# Patient Record
Sex: FEMALE | Race: BLACK OR AFRICAN AMERICAN | Employment: OTHER | URBAN - METROPOLITAN AREA
[De-identification: names, ages, dates, MRNs, and addresses within clinical notes are randomized per-mention and may not be internally consistent; named-entity substitution may affect disease eponyms.]

---

## 2017-11-17 ENCOUNTER — HOSPITAL ENCOUNTER (OUTPATIENT)
Dept: MAMMOGRAPHY | Age: 77
Discharge: HOME | End: 2017-11-17
Attending: INTERNAL MEDICINE | Admitting: INTERNAL MEDICINE

## 2017-11-17 DIAGNOSIS — Z12.39 BREAST CANCER SCREENING: ICD-10-CM

## 2018-12-06 ENCOUNTER — HOSPITAL ENCOUNTER (OUTPATIENT)
Dept: WOMENS IMAGING | Age: 78
Discharge: HOME | End: 2018-12-06
Payer: COMMERCIAL

## 2018-12-06 DIAGNOSIS — Z12.31 ENCOUNTER FOR SCREENING MAMMOGRAM FOR BREAST CANCER: ICD-10-CM

## 2018-12-06 PROCEDURE — 77067 SCR MAMMO BI INCL CAD: CPT

## 2019-12-16 ENCOUNTER — HOSPITAL ENCOUNTER (OUTPATIENT)
Dept: WOMENS IMAGING | Age: 79
Discharge: HOME | End: 2019-12-16
Payer: COMMERCIAL

## 2019-12-16 DIAGNOSIS — Z12.31 BREAST CANCER SCREENING BY MAMMOGRAM: ICD-10-CM

## 2019-12-16 PROCEDURE — 77063 BREAST TOMOSYNTHESIS BI: CPT

## 2021-01-29 ENCOUNTER — HOSPITAL ENCOUNTER (OUTPATIENT)
Dept: WOMENS IMAGING | Age: 81
Discharge: HOME | End: 2021-01-29
Payer: COMMERCIAL

## 2021-01-29 DIAGNOSIS — Z12.31 VISIT FOR SCREENING MAMMOGRAM: ICD-10-CM

## 2021-01-29 PROCEDURE — 77063 BREAST TOMOSYNTHESIS BI: CPT

## 2021-03-24 ENCOUNTER — APPOINTMENT (OUTPATIENT)
Dept: GENERAL RADIOLOGY | Age: 81
End: 2021-03-24
Payer: MEDICARE

## 2021-03-24 ENCOUNTER — HOSPITAL ENCOUNTER (OUTPATIENT)
Age: 81
Setting detail: OBSERVATION
Discharge: HOME | End: 2021-03-26
Attending: EMERGENCY MEDICINE | Admitting: HOSPITALIST
Payer: MEDICARE

## 2021-03-24 DIAGNOSIS — R07.9 CHEST PAIN, UNSPECIFIED TYPE: Primary | ICD-10-CM

## 2021-03-24 LAB
A/G RATIO: 1.4 (ref 1.1–2.2)
ALBUMIN SERPL-MCNC: 4.1 G/DL (ref 3.4–5)
ALP BLD-CCNC: 59 U/L (ref 40–129)
ALT SERPL-CCNC: 17 U/L (ref 10–40)
ANION GAP SERPL CALCULATED.3IONS-SCNC: 13 MMOL/L (ref 3–16)
AST SERPL-CCNC: 18 U/L (ref 15–37)
BASOPHILS ABSOLUTE: 0.1 K/UL (ref 0–0.2)
BASOPHILS RELATIVE PERCENT: 0.9 %
BILIRUB SERPL-MCNC: 0.3 MG/DL (ref 0–1)
BUN BLDV-MCNC: 16 MG/DL (ref 7–20)
CALCIUM SERPL-MCNC: 9 MG/DL (ref 8.3–10.6)
CHLORIDE BLD-SCNC: 103 MMOL/L (ref 99–110)
CO2: 21 MMOL/L (ref 21–32)
CREAT SERPL-MCNC: 0.8 MG/DL (ref 0.6–1.2)
EOSINOPHILS ABSOLUTE: 0.7 K/UL (ref 0–0.6)
EOSINOPHILS RELATIVE PERCENT: 11.5 %
GFR AFRICAN AMERICAN: >60
GFR NON-AFRICAN AMERICAN: >60
GLOBULIN: 2.9 G/DL
GLUCOSE BLD-MCNC: 142 MG/DL (ref 70–99)
HCT VFR BLD CALC: 36.1 % (ref 36–48)
HEMOGLOBIN: 11.6 G/DL (ref 12–16)
LYMPHOCYTES ABSOLUTE: 1.7 K/UL (ref 1–5.1)
LYMPHOCYTES RELATIVE PERCENT: 26.6 %
MCH RBC QN AUTO: 28 PG (ref 26–34)
MCHC RBC AUTO-ENTMCNC: 32.2 G/DL (ref 31–36)
MCV RBC AUTO: 86.9 FL (ref 80–100)
MONOCYTES ABSOLUTE: 0.5 K/UL (ref 0–1.3)
MONOCYTES RELATIVE PERCENT: 8 %
NEUTROPHILS ABSOLUTE: 3.3 K/UL (ref 1.7–7.7)
NEUTROPHILS RELATIVE PERCENT: 53 %
PDW BLD-RTO: 13.9 % (ref 12.4–15.4)
PLATELET # BLD: 256 K/UL (ref 135–450)
PMV BLD AUTO: 9.9 FL (ref 5–10.5)
POTASSIUM SERPL-SCNC: 3.9 MMOL/L (ref 3.5–5.1)
PRO-BNP: 40 PG/ML (ref 0–449)
RBC # BLD: 4.15 M/UL (ref 4–5.2)
SODIUM BLD-SCNC: 137 MMOL/L (ref 136–145)
TOTAL PROTEIN: 7 G/DL (ref 6.4–8.2)
TROPONIN: <0.01 NG/ML
WBC # BLD: 6.2 K/UL (ref 4–11)

## 2021-03-24 PROCEDURE — 83880 ASSAY OF NATRIURETIC PEPTIDE: CPT

## 2021-03-24 PROCEDURE — G0378 HOSPITAL OBSERVATION PER HR: HCPCS

## 2021-03-24 PROCEDURE — 84484 ASSAY OF TROPONIN QUANT: CPT

## 2021-03-24 PROCEDURE — 36415 COLL VENOUS BLD VENIPUNCTURE: CPT

## 2021-03-24 PROCEDURE — 93005 ELECTROCARDIOGRAM TRACING: CPT | Performed by: EMERGENCY MEDICINE

## 2021-03-24 PROCEDURE — 80053 COMPREHEN METABOLIC PANEL: CPT

## 2021-03-24 PROCEDURE — 99284 EMERGENCY DEPT VISIT MOD MDM: CPT

## 2021-03-24 PROCEDURE — 85025 COMPLETE CBC W/AUTO DIFF WBC: CPT

## 2021-03-24 PROCEDURE — 71045 X-RAY EXAM CHEST 1 VIEW: CPT

## 2021-03-24 ASSESSMENT — ENCOUNTER SYMPTOMS
SHORTNESS OF BREATH: 0
VOMITING: 0
CHEST TIGHTNESS: 0
NAUSEA: 0
ABDOMINAL PAIN: 0
DIARRHEA: 0

## 2021-03-24 ASSESSMENT — PAIN DESCRIPTION - LOCATION: LOCATION: CHEST

## 2021-03-24 ASSESSMENT — PAIN DESCRIPTION - DESCRIPTORS: DESCRIPTORS: BURNING

## 2021-03-25 LAB
A/G RATIO: 1.3 (ref 1.1–2.2)
ALBUMIN SERPL-MCNC: 4 G/DL (ref 3.4–5)
ALP BLD-CCNC: 60 U/L (ref 40–129)
ALT SERPL-CCNC: 16 U/L (ref 10–40)
ANION GAP SERPL CALCULATED.3IONS-SCNC: 8 MMOL/L (ref 3–16)
AST SERPL-CCNC: 14 U/L (ref 15–37)
BILIRUB SERPL-MCNC: 0.3 MG/DL (ref 0–1)
BUN BLDV-MCNC: 15 MG/DL (ref 7–20)
CALCIUM SERPL-MCNC: 9.5 MG/DL (ref 8.3–10.6)
CHLORIDE BLD-SCNC: 106 MMOL/L (ref 99–110)
CHOLESTEROL, TOTAL: 178 MG/DL (ref 0–199)
CO2: 25 MMOL/L (ref 21–32)
CREAT SERPL-MCNC: 0.8 MG/DL (ref 0.6–1.2)
EKG ATRIAL RATE: 68 BPM
EKG ATRIAL RATE: 80 BPM
EKG DIAGNOSIS: NORMAL
EKG DIAGNOSIS: NORMAL
EKG P AXIS: 43 DEGREES
EKG P AXIS: 44 DEGREES
EKG P-R INTERVAL: 184 MS
EKG P-R INTERVAL: 218 MS
EKG Q-T INTERVAL: 416 MS
EKG Q-T INTERVAL: 446 MS
EKG QRS DURATION: 118 MS
EKG QRS DURATION: 140 MS
EKG QTC CALCULATION (BAZETT): 474 MS
EKG QTC CALCULATION (BAZETT): 479 MS
EKG R AXIS: 10 DEGREES
EKG R AXIS: 8 DEGREES
EKG T AXIS: 15 DEGREES
EKG T AXIS: 36 DEGREES
EKG VENTRICULAR RATE: 68 BPM
EKG VENTRICULAR RATE: 80 BPM
ESTIMATED AVERAGE GLUCOSE: 157.1 MG/DL
GFR AFRICAN AMERICAN: >60
GFR NON-AFRICAN AMERICAN: >60
GLOBULIN: 3 G/DL
GLUCOSE BLD-MCNC: 105 MG/DL (ref 70–99)
GLUCOSE BLD-MCNC: 106 MG/DL (ref 70–99)
GLUCOSE BLD-MCNC: 142 MG/DL (ref 70–99)
GLUCOSE BLD-MCNC: 144 MG/DL (ref 70–99)
GLUCOSE BLD-MCNC: 145 MG/DL (ref 70–99)
GLUCOSE BLD-MCNC: 163 MG/DL (ref 70–99)
HBA1C MFR BLD: 7.1 %
HCT VFR BLD CALC: 37.7 % (ref 36–48)
HDLC SERPL-MCNC: 45 MG/DL (ref 40–60)
HEMOGLOBIN: 12.3 G/DL (ref 12–16)
LDL CHOLESTEROL CALCULATED: 119 MG/DL
LV EF: 65 %
LVEF MODALITY: NORMAL
MCH RBC QN AUTO: 28.5 PG (ref 26–34)
MCHC RBC AUTO-ENTMCNC: 32.5 G/DL (ref 31–36)
MCV RBC AUTO: 87.6 FL (ref 80–100)
PDW BLD-RTO: 13.9 % (ref 12.4–15.4)
PERFORMED ON: ABNORMAL
PLATELET # BLD: 278 K/UL (ref 135–450)
PMV BLD AUTO: 8.9 FL (ref 5–10.5)
POTASSIUM REFLEX MAGNESIUM: 4.4 MMOL/L (ref 3.5–5.1)
RBC # BLD: 4.3 M/UL (ref 4–5.2)
SODIUM BLD-SCNC: 139 MMOL/L (ref 136–145)
TOTAL PROTEIN: 7 G/DL (ref 6.4–8.2)
TRIGL SERPL-MCNC: 72 MG/DL (ref 0–150)
TROPONIN: <0.01 NG/ML
TROPONIN: <0.01 NG/ML
VLDLC SERPL CALC-MCNC: 14 MG/DL
WBC # BLD: 6 K/UL (ref 4–11)

## 2021-03-25 PROCEDURE — G0378 HOSPITAL OBSERVATION PER HR: HCPCS

## 2021-03-25 PROCEDURE — 36415 COLL VENOUS BLD VENIPUNCTURE: CPT

## 2021-03-25 PROCEDURE — 99153 MOD SED SAME PHYS/QHP EA: CPT

## 2021-03-25 PROCEDURE — 6370000000 HC RX 637 (ALT 250 FOR IP): Performed by: HOSPITALIST

## 2021-03-25 PROCEDURE — 93458 L HRT ARTERY/VENTRICLE ANGIO: CPT

## 2021-03-25 PROCEDURE — 85027 COMPLETE CBC AUTOMATED: CPT

## 2021-03-25 PROCEDURE — C1760 CLOSURE DEV, VASC: HCPCS

## 2021-03-25 PROCEDURE — 94761 N-INVAS EAR/PLS OXIMETRY MLT: CPT

## 2021-03-25 PROCEDURE — 93005 ELECTROCARDIOGRAM TRACING: CPT | Performed by: HOSPITALIST

## 2021-03-25 PROCEDURE — 2500000003 HC RX 250 WO HCPCS

## 2021-03-25 PROCEDURE — 99152 MOD SED SAME PHYS/QHP 5/>YRS: CPT

## 2021-03-25 PROCEDURE — 80053 COMPREHEN METABOLIC PANEL: CPT

## 2021-03-25 PROCEDURE — 93459 L HRT ART/GRFT ANGIO: CPT | Performed by: INTERNAL MEDICINE

## 2021-03-25 PROCEDURE — 6360000004 HC RX CONTRAST MEDICATION: Performed by: INTERNAL MEDICINE

## 2021-03-25 PROCEDURE — 2580000003 HC RX 258: Performed by: INTERNAL MEDICINE

## 2021-03-25 PROCEDURE — 84484 ASSAY OF TROPONIN QUANT: CPT

## 2021-03-25 PROCEDURE — 2580000003 HC RX 258: Performed by: HOSPITALIST

## 2021-03-25 PROCEDURE — 99205 OFFICE O/P NEW HI 60 MIN: CPT | Performed by: INTERNAL MEDICINE

## 2021-03-25 PROCEDURE — 2709999900 HC NON-CHARGEABLE SUPPLY

## 2021-03-25 PROCEDURE — C1894 INTRO/SHEATH, NON-LASER: HCPCS

## 2021-03-25 PROCEDURE — 6360000002 HC RX W HCPCS

## 2021-03-25 PROCEDURE — 93010 ELECTROCARDIOGRAM REPORT: CPT | Performed by: INTERNAL MEDICINE

## 2021-03-25 PROCEDURE — C1769 GUIDE WIRE: HCPCS

## 2021-03-25 PROCEDURE — 93306 TTE W/DOPPLER COMPLETE: CPT

## 2021-03-25 PROCEDURE — 94640 AIRWAY INHALATION TREATMENT: CPT

## 2021-03-25 PROCEDURE — 96372 THER/PROPH/DIAG INJ SC/IM: CPT

## 2021-03-25 PROCEDURE — 80061 LIPID PANEL: CPT

## 2021-03-25 PROCEDURE — 6360000002 HC RX W HCPCS: Performed by: HOSPITALIST

## 2021-03-25 PROCEDURE — 83036 HEMOGLOBIN GLYCOSYLATED A1C: CPT

## 2021-03-25 RX ORDER — LANOLIN ALCOHOL/MO/W.PET/CERES
1000 CREAM (GRAM) TOPICAL DAILY
COMMUNITY

## 2021-03-25 RX ORDER — INSULIN LISPRO 100 [IU]/ML
0-6 INJECTION, SOLUTION INTRAVENOUS; SUBCUTANEOUS
Status: DISCONTINUED | OUTPATIENT
Start: 2021-03-25 | End: 2021-03-26 | Stop reason: HOSPADM

## 2021-03-25 RX ORDER — NICOTINE POLACRILEX 4 MG
15 LOZENGE BUCCAL PRN
Status: DISCONTINUED | OUTPATIENT
Start: 2021-03-25 | End: 2021-03-26 | Stop reason: HOSPADM

## 2021-03-25 RX ORDER — LANOLIN ALCOHOL/MO/W.PET/CERES
10 CREAM (GRAM) TOPICAL NIGHTLY PRN
COMMUNITY

## 2021-03-25 RX ORDER — ASPIRIN 81 MG/1
81 TABLET, CHEWABLE ORAL DAILY
Status: DISCONTINUED | OUTPATIENT
Start: 2021-03-25 | End: 2021-03-26 | Stop reason: HOSPADM

## 2021-03-25 RX ORDER — POTASSIUM CHLORIDE 20 MEQ/1
40 TABLET, EXTENDED RELEASE ORAL PRN
Status: DISCONTINUED | OUTPATIENT
Start: 2021-03-25 | End: 2021-03-26 | Stop reason: HOSPADM

## 2021-03-25 RX ORDER — INSULIN LISPRO 100 [IU]/ML
0-3 INJECTION, SOLUTION INTRAVENOUS; SUBCUTANEOUS NIGHTLY
Status: DISCONTINUED | OUTPATIENT
Start: 2021-03-25 | End: 2021-03-26 | Stop reason: HOSPADM

## 2021-03-25 RX ORDER — PROMETHAZINE HYDROCHLORIDE 25 MG/1
12.5 TABLET ORAL EVERY 6 HOURS PRN
Status: DISCONTINUED | OUTPATIENT
Start: 2021-03-25 | End: 2021-03-26 | Stop reason: HOSPADM

## 2021-03-25 RX ORDER — SODIUM CHLORIDE 0.9 % (FLUSH) 0.9 %
10 SYRINGE (ML) INJECTION EVERY 12 HOURS SCHEDULED
Status: DISCONTINUED | OUTPATIENT
Start: 2021-03-25 | End: 2021-03-26 | Stop reason: HOSPADM

## 2021-03-25 RX ORDER — DEXTROSE MONOHYDRATE 50 MG/ML
100 INJECTION, SOLUTION INTRAVENOUS PRN
Status: DISCONTINUED | OUTPATIENT
Start: 2021-03-25 | End: 2021-03-26 | Stop reason: HOSPADM

## 2021-03-25 RX ORDER — SPIRONOLACTONE 25 MG/1
50 TABLET ORAL DAILY
Status: DISCONTINUED | OUTPATIENT
Start: 2021-03-25 | End: 2021-03-26 | Stop reason: HOSPADM

## 2021-03-25 RX ORDER — SODIUM CHLORIDE 0.9 % (FLUSH) 0.9 %
10 SYRINGE (ML) INJECTION PRN
Status: DISCONTINUED | OUTPATIENT
Start: 2021-03-25 | End: 2021-03-26 | Stop reason: HOSPADM

## 2021-03-25 RX ORDER — AMLODIPINE BESYLATE 10 MG/1
10 TABLET ORAL DAILY
COMMUNITY

## 2021-03-25 RX ORDER — POTASSIUM CHLORIDE 7.45 MG/ML
10 INJECTION INTRAVENOUS PRN
Status: DISCONTINUED | OUTPATIENT
Start: 2021-03-25 | End: 2021-03-26 | Stop reason: HOSPADM

## 2021-03-25 RX ORDER — SPIRONOLACTONE 50 MG/1
50 TABLET, FILM COATED ORAL DAILY
COMMUNITY

## 2021-03-25 RX ORDER — ONDANSETRON 2 MG/ML
4 INJECTION INTRAMUSCULAR; INTRAVENOUS EVERY 6 HOURS PRN
Status: DISCONTINUED | OUTPATIENT
Start: 2021-03-25 | End: 2021-03-26 | Stop reason: HOSPADM

## 2021-03-25 RX ORDER — MAGNESIUM SULFATE IN WATER 40 MG/ML
2000 INJECTION, SOLUTION INTRAVENOUS PRN
Status: DISCONTINUED | OUTPATIENT
Start: 2021-03-25 | End: 2021-03-26 | Stop reason: HOSPADM

## 2021-03-25 RX ORDER — AMLODIPINE BESYLATE 5 MG/1
10 TABLET ORAL DAILY
Status: DISCONTINUED | OUTPATIENT
Start: 2021-03-25 | End: 2021-03-26 | Stop reason: HOSPADM

## 2021-03-25 RX ORDER — DEXTROSE MONOHYDRATE 25 G/50ML
12.5 INJECTION, SOLUTION INTRAVENOUS PRN
Status: DISCONTINUED | OUTPATIENT
Start: 2021-03-25 | End: 2021-03-26 | Stop reason: HOSPADM

## 2021-03-25 RX ORDER — ACETAMINOPHEN 160 MG
TABLET,DISINTEGRATING ORAL
COMMUNITY

## 2021-03-25 RX ORDER — ASPIRIN 81 MG/1
81 TABLET, CHEWABLE ORAL DAILY
COMMUNITY

## 2021-03-25 RX ORDER — NITROGLYCERIN 0.4 MG/1
0.4 TABLET SUBLINGUAL EVERY 5 MIN PRN
Status: DISCONTINUED | OUTPATIENT
Start: 2021-03-25 | End: 2021-03-26 | Stop reason: HOSPADM

## 2021-03-25 RX ORDER — EZETIMIBE 10 MG/1
10 TABLET ORAL DAILY
COMMUNITY

## 2021-03-25 RX ORDER — EZETIMIBE 10 MG/1
10 TABLET ORAL NIGHTLY
Status: DISCONTINUED | OUTPATIENT
Start: 2021-03-25 | End: 2021-03-25 | Stop reason: RX

## 2021-03-25 RX ORDER — ACETAMINOPHEN 325 MG/1
650 TABLET ORAL EVERY 4 HOURS PRN
Status: DISCONTINUED | OUTPATIENT
Start: 2021-03-25 | End: 2021-03-26 | Stop reason: HOSPADM

## 2021-03-25 RX ORDER — ACETAMINOPHEN 650 MG/1
650 SUPPOSITORY RECTAL EVERY 6 HOURS PRN
Status: DISCONTINUED | OUTPATIENT
Start: 2021-03-25 | End: 2021-03-26 | Stop reason: HOSPADM

## 2021-03-25 RX ORDER — LOSARTAN POTASSIUM 100 MG/1
100 TABLET ORAL DAILY
COMMUNITY

## 2021-03-25 RX ORDER — POLYETHYLENE GLYCOL 3350 17 G/17G
17 POWDER, FOR SOLUTION ORAL DAILY PRN
Status: DISCONTINUED | OUTPATIENT
Start: 2021-03-25 | End: 2021-03-26 | Stop reason: HOSPADM

## 2021-03-25 RX ORDER — BUDESONIDE AND FORMOTEROL FUMARATE DIHYDRATE 160; 4.5 UG/1; UG/1
2 AEROSOL RESPIRATORY (INHALATION) 2 TIMES DAILY
Status: DISCONTINUED | OUTPATIENT
Start: 2021-03-25 | End: 2021-03-26 | Stop reason: HOSPADM

## 2021-03-25 RX ORDER — LOSARTAN POTASSIUM 100 MG/1
100 TABLET ORAL DAILY
Status: DISCONTINUED | OUTPATIENT
Start: 2021-03-25 | End: 2021-03-26 | Stop reason: HOSPADM

## 2021-03-25 RX ORDER — ACETAMINOPHEN 325 MG/1
650 TABLET ORAL EVERY 6 HOURS PRN
Status: DISCONTINUED | OUTPATIENT
Start: 2021-03-25 | End: 2021-03-26 | Stop reason: HOSPADM

## 2021-03-25 RX ADMIN — Medication 2 PUFF: at 08:35

## 2021-03-25 RX ADMIN — TICAGRELOR 90 MG: 90 TABLET ORAL at 09:03

## 2021-03-25 RX ADMIN — Medication 10 ML: at 09:04

## 2021-03-25 RX ADMIN — ENOXAPARIN SODIUM 40 MG: 40 INJECTION SUBCUTANEOUS at 09:02

## 2021-03-25 RX ADMIN — IOPAMIDOL 74 ML: 755 INJECTION, SOLUTION INTRAVENOUS at 15:49

## 2021-03-25 RX ADMIN — NITROGLYCERIN 0.5 INCH: 20 OINTMENT TOPICAL at 07:29

## 2021-03-25 RX ADMIN — Medication 10 ML: at 20:18

## 2021-03-25 RX ADMIN — AMLODIPINE BESYLATE 10 MG: 5 TABLET ORAL at 09:03

## 2021-03-25 RX ADMIN — ASPIRIN 81 MG: 81 TABLET, CHEWABLE ORAL at 09:03

## 2021-03-25 RX ADMIN — SPIRONOLACTONE 50 MG: 25 TABLET ORAL at 09:03

## 2021-03-25 RX ADMIN — LEVOTHYROXINE SODIUM 125 MCG: 0.03 TABLET ORAL at 07:31

## 2021-03-25 RX ADMIN — TICAGRELOR 90 MG: 90 TABLET ORAL at 20:18

## 2021-03-25 RX ADMIN — INSULIN LISPRO 1 UNITS: 100 INJECTION, SOLUTION INTRAVENOUS; SUBCUTANEOUS at 21:21

## 2021-03-25 RX ADMIN — Medication 2 PUFF: at 19:59

## 2021-03-25 RX ADMIN — LOSARTAN POTASSIUM 100 MG: 100 TABLET, FILM COATED ORAL at 09:03

## 2021-03-25 RX ADMIN — NITROGLYCERIN 0.5 INCH: 20 OINTMENT TOPICAL at 00:25

## 2021-03-25 ASSESSMENT — PAIN SCALES - GENERAL
PAINLEVEL_OUTOF10: 0
PAINLEVEL_OUTOF10: 0

## 2021-03-25 NOTE — ED TRIAGE NOTES
Pt reports to ED from home after feeling a burning sensation mid to left chest with onset approx 2000. Pt took nitrox2 with no relief. Pt denies any other symptoms. Pt had 2 stents placed at [facility] 3/16/2021. Pt skin warm and dry. Pt respirations easy and unlabored with patent airway. Pt A&Ox4.

## 2021-03-25 NOTE — H&P
_    [facility]    3/24/2021 11:43 PM    Patient Information:  Linnea Carpio is a 68 y.o. female [id number]    PCP:  Yessenia Yuan MD (Tel: [phone] )    Date of Service:   Pt seen/examined on 3/24/2021   Placed in Observation. Chief complaint:    Chief Complaint   Patient presents with    Chest Pain       History of Present Illness:  Rosa Lazaro is a 68 y.o. female who presented with   · Chest pain @ home . Has been happening on and off since AM . Started getting worse around 2000 . She Took 2 NTG @ Home and relief of pain noted with the 2nd one . Reports radiation of pain to left Armpit as well. · Recently had PCI X 2 done @ 3/16/21 @ [facility] for unstable Angina   · Is on DAPT @ Home . Reports she has taken her medications for today   · Chest pain much better @ ED arrival   ·       History and pertinent information obtained from   · ED provider   · Prior Chart   · Patient          Notable/Significant ED Findings/VItals :   · VSS        While in ED  · Patient care Given. · Appropriate Monitoring done. · Pertinent Labs done. See [facility] for details   · Pertinent Acute issues identified and managed . See Epic for details  · Pertinent consults called and case d/w them by ED Provider . See Epic for details. · Imaging  CXR ,  done in ED . While in ED, Indicated/Pertinent Medications/Care given as below      · ED Chart reviewed. Medications reviewed w/c were give in ED . Imaging done in ED reviewed and results noted. See Epic for details on medications and orders . · Imaging done in ED as below. And is/are  S/o No acute findings or is unremarkable for any acute pathology needing acute interventions, on review. · Pertinent Abnormal Labs and their interpretation/active and acute issues, as mentioned below in Assessment and Plan.        Currently, on my evaluation, patient is :   · Since arrival, post above Rx, patient is AO X 3   · No SOB   · Mild Chest pain still reported   · Is Breathing comfortably on current O2 needs and no distress noted . 10 complete review of symptoms performed. Pertinent positives and negatives listed above in HPI. REVIEW OF SYSTEMS:     10 complete review of symptoms performed. Pertinent positives and negatives listed above in HPI. Past Medical History:         has a past medical history of Asthma, Diabetes mellitus ([address] Hypercholesteremia, Hypertension, and Thyroid disease. Past Surgical History:         has a past surgical history that includes shoulder surgery; Foot surgery; and Knee arthroscopy. Medications:          Current Outpatient Medications on File Prior to Encounter   Medication Sig Dispense Refill    simvastatin (ZOCOR) 10 MG tablet Take 10 mg by mouth nightly.  furosemide (LASIX) 20 MG tablet Take 20 mg by mouth 2 times daily.  verapamil (COVERA HS) 240 MG (CO) CR tablet Take 240 mg by mouth nightly.  potassium chloride (KLOR-CON) 10 MEQ CR tablet Take 10 mEq by mouth daily.  quinapril (ACCUPRIL) 40 MG tablet Take 40 mg by mouth nightly.  Levothyroxine Sodium 112 MCG CAPS Take 112 mcg by mouth daily.  theophylline CR, 12 hour, (THEODUR) 200 MG CR tablet Take 200 mg by mouth 2 times daily.  metformin (GLUCOPHAGE-XR) 500 MG XR tablet Take 1,000 mg by mouth daily (with breakfast).  fluticasone-salmeterol (ADVAIR HFA) 115-21 MCG/ACT inhaler Inhale 2 puffs into the lungs daily.  Multiple Vitamins-Minerals (CENTRUM SILVER PO) Take  by mouth.  glipiZIDE (GLUCOTROL) 2.5 MG CR tablet Take 2.5 mg by mouth daily. Allergies: Allergies   Allergen Reactions    Hydralazine     Penicillins     Valacyclovir           Social History:   reports that she has never smoked. She has never used smokeless tobacco. She reports that she does not drink alcohol or use drugs.        Family History:          No family history on file.        Physical Exam:  /63   Pulse 72   Temp 98.7 °F (37.1 °C) (Oral)   Resp 13   SpO2 99%     General appearance:  Appears  comfortable. Well nourished   Eyes:  Sclera clear, pupils equal  ENT: Moist mucus membranes, Trachea midline. Cardiovascular: Regular rhythm, normal S1, S2. No edema in lower extremities   Respiratory:  Noted Dec AE B/ L . No wheeze, good inspiratory effort   Gastrointestinal:  Abdomen soft,  non-tender,  not distended,   Musculoskeletal:  No cyanosis in digits, neck supple  Neurology:  Cranial nerves grossly intact. Alert and oriented in time, place and person. No speech or motor deficits   Psychiatry:  Appropriate affect. Not agitated  Skin: Warm, dry, normal turgor, no rash       Labs:     Recent Labs     03/24/21  2253   WBC 6.2   HGB 11.6*   HCT 36.1        Recent Labs     03/24/21  2253      K 3.9      CO2 21   BUN 16   CREATININE 0.8   CALCIUM 9.0     Recent Labs     03/24/21  2253   AST 18   ALT 17   BILITOT 0.3   ALKPHOS 59     No results for input(s): INR in the last 72 hours. Recent Labs     03/24/21  2253   TROPONINI <0.01         Urinalysis:      Lab Results   Component Value Date    NITRU NEGATIVE 02/13/2013    WBCUA Rare 02/13/2013    BACTERIA 3+ 02/13/2013    RBCUA 5-10 03/31/2011    BLOODU NEGATIVE 02/13/2013    SPECGRAV >=1.030 02/13/2013    GLUCOSEU NEGATIVE 02/13/2013    GLUCOSEU NEGATIVE 03/31/2011         Radiology:     CXR:   I have reviewed the CXR  No acute findings or is unremarkable for any acute pathology needing acute interventions, on review. EKG/Telemonitor :    I have reviewed the EKG  Normal sinus rhythm  Right bundle branch block  Inverted T waves leads V1-3     IMAGING :     XR CHEST PORTABLE   Final Result   No radiographic evidence of acute cardiopulmonary disease.            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- om bypass grafts  · Severe heavily calcified ostial RCA disease  · Normal LV systolic function ejection fraction 65%  · No mitral regurgitation or aortic valve stenosis  · Mild aortic valve regurgitation  · INTERVENTION: OSTIAL RCA SHOCKWAVE LITHOTRIPSY 3.5 MM BALLOON, WITH STENTING FROM THE MID DISTAL VESSEL BACK TO THE OSTIUM OVERLAPPING 2.5 X 38 AND 3.5 X 20 MM SYNERGY DRUG-ELUTING STENTS, POST DILATED 3.0 AND 4.0 MM       PROBLEM LIST      Active Hospital Problems    Diagnosis Date Noted    Chest pain at rest [R07.9] 03/24/2021         PLAN/ORDERS FOR THIS ADMISSION/HOSPITALIZATION       · Chest pain , POA , r/o ACS . Will continue cardiac monitor while inpatient. Admission EKG reviewed . EKG prn chest pain. Cardiac enzymes on admission in ED noted per Labs and will trend cardiac enzymes further while inpatient. => Medication Plan for now will be as follows . Resumed home DAPT + statins + NTG Prn  . => Further recommendations/Evaluation/Mgt in AM by CARDS 2/2 recent PCI done @ 3/17/21 . ·   · CAD . Recent PCI X 2 done  RCA . DAPT + statins   · CABG in past in 2016   · Essential Hypertension   · Hypothyroidism   · DM 2 . While inpatient, start Insulin Regimen per orders and will closely monitor blood sugars and adjust regimen prn . Rx of Hypoglycemia prn , per order sets. · Mixed Hyperlipidemia   · GERD        ·   Home medications for chronic medical problems reviewed and Held / Resumed / Pertinent changes made [as mentioned above] in home medications, as clinically warranted/Indicated . See EPIC orders for details         · DVT Prophylaxis . Lovenox SQ    ; + SCDs   · Nutrition/Diet. Diet NPO, After Midnight  · DIET CARB CONTROL;  · Code Status . Full Code  · PT/OT and ambulatory Eval Status. Ambulate with Assist   · Probable LOS & future Disposition planned post discharge . Home in 1-2 days       Please see EPIC orders for detailed orders/recommendations of plan and medications.        CONSULTS ORDERED @ ADMISSION   IP CONSULT TO CARDIOLOGY      Medical Decision Making : HIGH     Total patient Care [ Direct and Indirect ] time spent in evaluating the patient an discussing plan with appropriate staff/patient/family members is 54 min       Bessie Frias MD    Hospitalist, [facility].    3/24/2021 12:17 AM

## 2021-03-25 NOTE — CONSULTS
Cardiovascular Consultation     Attending Physician: Macie Hopper MD    PATIENT: Jann Tan  : 1940  MRN: [id number]    Reason for Consultation:   Chief Complaint   Patient presents with    Chest Pain       History of present illness:   Ms. Jann Tan is a 68 y.o. female patient of [facility] Dr. Germán Siu, who had recent coronary stenting (PCI-RCA 3/16/21) in the setting of unstable angina, presented from home yesterday with chest pain. Demar Chappell states that she had no recurrent chest pain, described historically as \"burning\" for the first three days post procedure. She \"was pleased the burning had gone away completely\". She states a much less intense burning briefly appeared on day 4 and she \"slept it off\". This morning, she notes it was a mid grade intensity to her chest burning at rest. States day had been ordinary without any notable event or trigger. No relation to food, position or exertion. Decided to seek medical attention when it was Nitro responsive at home. Compliant with medications, specifically confirmed twice daily Brilinta and once daily baby aspirin. Denies palpitations, lightheadedness, N/V, diaphoresis, or syncope and is without shortness of breath, PND, orthopnea, or LE edema. States that burning again responded to [facility] patch here but is currently noting breakthrough sensations.      Medical History:      Diagnosis Date    Asthma     Diabetes mellitus ([facility] [address]     Hypercholesteremia     Hypertension     Thyroid disease        Surgical History:      Procedure Laterality Date    FOOT SURGERY      LEFT    KNEE ARTHROSCOPY      RIGHT    SHOULDER SURGERY      LEFT       Social History:  Social History     Socioeconomic History    Marital status:      Spouse name: Not on file    Number of children: Not on file    Years of education: Not on file    Highest education level: Not on file   Occupational History    Not on file Social Needs    Financial resource strain: Not on file    Food insecurity     Worry: Not on file     Inability: Not on file    Transportation needs     Medical: Not on file     Non-medical: Not on file   Tobacco Use    Smoking status: Never Smoker    Smokeless tobacco: Never Used   Substance and Sexual Activity    Alcohol use: No    Drug use: No    Sexual activity: Not on file   Lifestyle    Physical activity     Days per week: Not on file     Minutes per session: Not on file    Stress: Not on file   Relationships    Social connections     Talks on phone: Not on file     Gets together: Not on file     Attends Buddhist service: Not on file     Active member of club or organization: Not on file     Attends meetings of clubs or organizations: Not on file     Relationship status: Not on file    Intimate partner violence     Fear of current or ex partner: Not on file     Emotionally abused: Not on file     Physically abused: Not on file     Forced sexual activity: Not on file   Other Topics Concern    Not on file   Social History Narrative    Not on file        Family History:  No evidence for sudden cardiac death or premature CAD. No family history on file.     Medications:  sodium chloride flush 0.9 % injection 10 mL, 2 times per day  sodium chloride flush 0.9 % injection 10 mL, PRN  promethazine (PHENERGAN) tablet 12.5 mg, Q6H PRN    Or  ondansetron (ZOFRAN) injection 4 mg, Q6H PRN  acetaminophen (TYLENOL) tablet 650 mg, Q6H PRN    Or  acetaminophen (TYLENOL) suppository 650 mg, Q6H PRN  polyethylene glycol (GLYCOLAX) packet 17 g, Daily PRN  aspirin chewable tablet 81 mg, Daily  potassium chloride (KLOR-CON M) extended release tablet 40 mEq, PRN    Or  potassium bicarb-citric acid (EFFER-K) effervescent tablet 40 mEq, PRN    Or  potassium chloride 10 mEq/100 mL IVPB (Peripheral Line), PRN  magnesium sulfate 2000 mg in 50 mL IVPB premix, PRN  enoxaparin (LOVENOX) injection 40 mg, Daily  nitroGLYCERIN (NITROSTAT) SL tablet 0.4 mg, Q5 Min PRN  glucose (GLUTOSE) 40 % oral gel 15 g, PRN  dextrose 50 % IV solution, PRN  glucagon (rDNA) injection 1 mg, PRN  dextrose 5 % solution, PRN  insulin glargine (LANTUS;BASAGLAR) injection pen 10 Units, Nightly  insulin lispro (1 Unit Dial) 0-6 Units, TID WC  insulin lispro (1 Unit Dial) 0-3 Units, Nightly  nitroglycerin (NITRO-BID) 2 % ointment 0.5 inch, 3 times per day  spironolactone (ALDACTONE) tablet 50 mg, Daily  losartan (COZAAR) tablet 100 mg, Daily  amLODIPine (NORVASC) tablet 10 mg, Daily  levothyroxine (SYNTHROID) tablet 125 mcg, Daily  budesonide-formoterol (SYMBICORT) 160-4.5 MCG/ACT inhaler 2 puff, BID  ticagrelor (BRILINTA) tablet 90 mg, BID        Allergies:  Hydralazine, Penicillins, and Valacyclovir     Review of Systems:   [x]Full ROS obtained and negative except as mentioned in HPI    Physical Examination:    /81   Pulse 68   Temp 98.5 °F (36.9 °C) (Oral)   Resp 16   Ht 5' 5\" (1.651 m)   Wt 154 lb 8.7 oz (70.1 kg)   SpO2 96%   BMI 25.72 kg/m²   Wt Readings from Last 3 Encounters:   03/25/21 154 lb 8.7 oz (70.1 kg)       GENERAL: Well developed, well nourished, no acute distress  NEUROLOGICAL: Alert and oriented x3  PSYCH: Normal mood and affect   SKIN: Warm and dry, without lesions  HEENT: Normocephalic, atraumatic, Sclera non-icteric, mucous membranes moist  NECK: supple, JVP normal, thyroid not enlarged   CAROTID: Normal upstroke, no bruits  CARDIAC: Normal PMI, regular rate and rhythm, normal S1S2, no murmur, rub  RESPIRATORY: Normal respiratory effort, clear to auscultation bilaterally  EXTREMITIES: No cyanosis, clubbing or edema, palpable pulses bilaterally   MUSCULOSKELETAL: No joint swelling or tenderness, no chest wall tenderness  GASTROINTESTINAL:  soft, non-tender, no bruit  Right groin soft c/d/i     Labs:  Lab Review   Lab Results   Component Value Date     03/25/2021    K 4.4 03/25/2021     03/25/2021    CO2 25 03/25/2021 BUN 15 03/25/2021    CREATININE 0.8 03/25/2021    GLUCOSE 145 03/25/2021    CALCIUM 9.5 03/25/2021     Lab Results   Component Value Date    TROPONINI <0.01 03/25/2021     Lab Results   Component Value Date    WBC 6.0 03/25/2021    HGB 12.3 03/25/2021    HCT 37.7 03/25/2021    MCV 87.6 03/25/2021     03/25/2021     Lab Results   Component Value Date    CHOL 178 03/25/2021    TRIG 72 03/25/2021    HDL 45 03/25/2021       Imaging:  I have reviewed the below testing personally:    EKG:    3/25/21  Sinus rhythm with 1st degree A-V block  Right bundle branch block  Inferior infarct, age indeterminate     2017 TTE  - Left ventricle: The cavity size was normal. There was mild    concentric hypertrophy. Systolic function was normal. The    calculated ejection fraction was in the range of 64% to 68%. Wall    motion was normal; there were no regional wall motion    abnormalities. Doppler parameters are consistent with abnormal    left ventricular relaxation (grade 1 diastolic dysfunction). - Aortic valve: There was mild regurgitation. Valve area (VTI):    2.36cm^2. Valve area (Vmax): 2.34cm^2. - Mitral valve: The annulus was mildly calcified. The leaflets were    mildly thickened. - Tricuspid valve: There was mild-moderate regurgitation.  - Inferior vena cava: The vessel was normal in size; the    respirophasic diameter changes were in the normal range (>= 50%);    findings are consistent with normal central venous pressure.       Cath 3/16/2021 @ [facility] (Dr. 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 Last Gillette is a 68 y.o. female patient of [facility] Dr. William Miller:     Chest pain, concerning for angina   CAD: LIMA-LAD patent, SVG-OM patent, PCI- RCA 3/16/21  Hypertension, controlled   Hyperlipidemia, uncontrolled   Diabetes mellitus   PVD  Statin intolerance      Previous angina described as substernal chest burning has recurred one week following complex PCI to native RCA. Patient reports compliance with medications and provides no interval change by history. No suggestion of ischemia/injury by initial ECGs and biomarkers. However, she is having breakthough, Nitro-responsive pain. Recommend coronary angiography. Risks, benefits, goals, and alternatives of left heart catheterization with the potential for percutaneous coronary intervention discussed with patient; including stroke, heart attack, kidney damage, death, paralysis, disability, damage to nerves/arteries/veins. All questions answered and informed consent obtained. Thank you for allowing me to participate in the care of your patient. Please do not hesitate to call. [facility] ([facility]DO, [facility]  Interventional Cardiology     o: [phone]  [address] [place] [address]., [address] [facility], [address] [facility]      NOTE:  This report was transcribed using voice recognition software. Every effort was made to ensure accuracy; however, inadvertent computerized transcription errors may be present.

## 2021-03-25 NOTE — PROGRESS NOTES
Admission and Assessment complete, see doc flowsheet. Patient resting in bed, call light in reach, bed in lowest position, brake set. Patient denies any needs at this time. Patient encouraged to call if needs arise.   Alvarado mock RN

## 2021-03-25 NOTE — PLAN OF CARE
Problem: Falls - Risk of:  Goal: Will remain free from falls  Description: Will remain free from falls  Note: Patient free from harm. ID bands on, bed in lowest position, call light in reach. Patient instructed to call for help if needed. Patient educated on ambulation and safety.

## 2021-03-25 NOTE — PRE SEDATION
Brief Pre-Op Note/Sedation Assessment      Del Damon  1940  [id number]      [id number]  3:00 PM    Planned Procedure: Cardiac Catheterization Procedure    Post Procedure Plan: Return to same level of care    Consent: I have discussed with the patient and/or the patient representative the indication, alternatives, and the possible risks and/or complications of the planned procedure and the anesthesia methods. The patient and/or patient representative appear to understand and agree to proceed. Chief Complaint: Chest Pain/Pressure      Indications for Cath Procedure: Worsening Angina  Anginal Classification within 2 weeks:  CCS IV - Inability to perform any activity without angina or angina at rest, i.e., severe limitation  NYHA Heart Failure Class within 2 weeks: No symptoms  Is Cath Lab Visit Valve-related?: No  Surgical Risk: Intermediate  Functional Type: >= 4 METS with symptoms    Anti- Anginal Meds within 2 weeks:   Yes: Ca Channel Blockers, Aspirin, Non-Statin (Any) and Statin (Any)    Stress or Imaging Studies Performed:  None     Vital Signs:  /81   Pulse 68   Temp 98.5 °F (36.9 °C) (Oral)   Resp 16   Ht 5' 5\" (1.651 m)   Wt 154 lb 8.7 oz (70.1 kg)   SpO2 96%   BMI 25.72 kg/m²     Allergies:   Allergies   Allergen Reactions    Hydralazine     Penicillins     Valacyclovir        Past Medical History:  Past Medical History:   Diagnosis Date    Asthma     Diabetes mellitus ([facility] [address]     Hypercholesteremia     Hypertension     Thyroid disease          Surgical History:  Past Surgical History:   Procedure Laterality Date    FOOT SURGERY      LEFT    KNEE ARTHROSCOPY      RIGHT    SHOULDER SURGERY      LEFT         Medications:  Current Facility-Administered Medications   Medication Dose Route Frequency Provider Last Rate Last Admin    sodium chloride flush 0.9 % injection 10 mL  10 mL Intravenous 2 times per day Ciara Robledo MD   10 mL at 03/25/21 0904    sodium chloride flush 0.9 % injection 10 mL  10 mL Intravenous PRN Juan Carrasco MD        promethazine (PHENERGAN) tablet 12.5 mg  12.5 mg Oral Q6H PRN Juan Carrasco MD        Or    ondansetron (ZOFRAN) injection 4 mg  4 mg Intravenous Q6H PRN Juan Carrasco MD        acetaminophen (TYLENOL) tablet 650 mg  650 mg Oral Q6H PRN Juan Carrasco MD        Or    acetaminophen (TYLENOL) suppository 650 mg  650 mg Rectal Q6H PRN Juan Carrasco MD        polyethylene glycol (GLYCOLAX) packet 17 g  17 g Oral Daily PRN Juan Carrasco MD        aspirin chewable tablet 81 mg  81 mg Oral Daily Morris Prado MD   81 mg at 03/25/21 0903    potassium chloride (KLOR-CON M) extended release tablet 40 mEq  40 mEq Oral PRN Juan Carrasco MD        Or    potassium bicarb-citric acid (EFFER-K) effervescent tablet 40 mEq  40 mEq Oral PRN Juan Carrasco MD        Or    potassium chloride 10 mEq/100 mL IVPB (Peripheral Line)  10 mEq Intravenous PRN Juan Carrasco MD        magnesium sulfate 2000 mg in 50 mL IVPB premix  2,000 mg Intravenous PRN Juan Carrasco MD        enoxaparin (LOVENOX) injection 40 mg  40 mg Subcutaneous Daily Morris Prado MD   40 mg at 03/25/21 0902    nitroGLYCERIN (NITROSTAT) SL tablet 0.4 mg  0.4 mg Sublingual Q5 Min PRN Morris Prado MD        glucose (GLUTOSE) 40 % oral gel 15 g  15 g Oral PRN Morris Prado MD        dextrose 50 % IV solution  12.5 g Intravenous PRN Juan Carrasco MD        glucagon (rDNA) injection 1 mg  1 mg Intramuscular PRN Morris Prado MD        dextrose 5 % solution  100 mL/hr Intravenous PRN Morris Prado MD        insulin glargine (LANTUS;BASAGLAR) injection pen 10 Units  10 Units Subcutaneous Nightly Morris Prado MD        insulin lispro (1 Unit Dial) 0-6 Units  0-6 Units Subcutaneous TID WC Morris Prado MD        insulin lispro (1 Unit Dial) 0-3 Units  0-3 Units Subcutaneous Nightly Surinder Weathers MD        nitroglycerin (NITRO-BID) 2 % ointment 0.5 inch  0.5 inch Topical 3 times per day Surinder Weathers MD   0.5 inch at 03/25/21 0729    spironolactone (ALDACTONE) tablet 50 mg  50 mg Oral Daily Morris Prado MD   50 mg at 03/25/21 0903    losartan (COZAAR) tablet 100 mg  100 mg Oral Daily Morris Prado MD   100 mg at 03/25/21 0903    amLODIPine (NORVASC) tablet 10 mg  10 mg Oral Daily Morris Prado MD   10 mg at 03/25/21 2087    levothyroxine (SYNTHROID) tablet 125 mcg  125 mcg Oral Daily Morris Prado MD   125 mcg at 03/25/21 0731    budesonide-formoterol (SYMBICORT) 160-4.5 MCG/ACT inhaler 2 puff  2 puff Inhalation BID Surinder Weathers MD   2 puff at 03/25/21 0835    ticagrelor (BRILINTA) tablet 90 mg  90 mg Oral BID Surinder Weathers MD   90 mg at 03/25/21 8492           Pre-Sedation:    Pre-Sedation Documentation and Exam:  I have assessed the patient and agree with the H&P present on the chart. Prior History of Anesthesia Complications:   none    Modified Mallampati:  II (soft palate, uvula, fauces visible)    ASA Classification:  Class 3 - A patient with severe systemic disease that limits activity but is not incapacitating      Jayashree Scale: Activity:  2 - Able to move 4 extremities voluntarily on command  Respiration:  2 - Able to breathe deeply and cough freely  Circulation:  2 - BP+/- 20mmHg of normal  Consciousness:  2 - Fully awake  Oxygen Saturation (color):  2 - Able to maintain oxygen saturation >92% on room air    Sedation/Anesthesia Plan:  Guard the patient's safety and welfare. Minimize physical discomfort and pain. Minimize negative psychological responses to treatment by providing sedation and analgesia and maximize the potential amnesia. Patient to meet pre-procedure discharge plan.     Medication Planned:  midazolam intravenously and fentanyl intravenously    Patient is an appropriate candidate for plan of sedation: yes The risks, benefits, goals, and alternatives of the procedure were discussed in detail with the patient. Informed consent was obtained and further recommendations will be made following the procedure. Ronald Chisholm DO, [facility] [place]  Interventional Cardiology     o: [phone]  [address] [facility], [address] [facility]      NOTE:  This report was transcribed using voice recognition software. Every effort was made to ensure accuracy; however, inadvertent computerized transcription errors may be present.

## 2021-03-25 NOTE — ED PROVIDER NOTES
I independently performed a history and physical on Marie Mack. All diagnostic, treatment, and disposition decisions were made by myself in conjunction with the advanced practice provider. Briefly, this is a 68 y.o. female here for chest pain. Started while at rest at roughly 8 PM.  Took 1 nitroglycerin which lowered it from 10 out of 10 to 8 out of 10. Took second nitroglycerin which helped a little more. By the time she came to the hospital, the chest pain is now resolved. No fevers or chills. No shortness of breath. States that she has on occasion had chest discomfort however it has never been severe and rarely if ever requires nitroglycerin    On exam,   General: Patient is in no acute distress  Skin: No cyanosis  HEENT: Moist mucous membranes  Heart: Regular rate, regular rhythm  Lung: No respiratory distress  Abdomen: Soft, nontender  Neuro: Moving all extremities, no facial droop, no slurred speech, answers questions appropriately        EKG  The Ekg interpreted by me in the absence of a cardiologist shows. Normal sinus rhythm  Right bundle branch block  Inverted T waves leads V2, V3, 3  No old for comparison  Heart rate 80  QTc 479    Screenings            MDM  Patient is a 68-year-old woman with history of coronary artery disease who presents with chest pain that is now resolved after 2 nitroglycerin. Does have occasional chest pain however this is more severe than prior. Concern for possible ACS. Did recently have left heart cath with 2 stents placed and multivessel disease. Concern for possible in-stent occlusion. Has taken her aspirin and Brilinta. Will admit to hospitalist service for further chest pain work-up. Given lack of tachycardia, tachypnea, hypoxemia, unilateral DVT symptoms, less likely to be due to pulmonary embolism. No hypertension, radiation to the back, or unequal pulses to indicate dissection. Patient Referrals:  No follow-up provider specified.     Discharge Medications:  New Prescriptions    No medications on file       FINAL IMPRESSION  1. Chest pain, unspecified type        Blood pressure 123/63, pulse 72, temperature 98.7 °F (37.1 °C), temperature source Oral, resp. rate 13, SpO2 99 %. For further details of [facility] emergency department encounter, please see documentation by advanced practice provider, Uday Marte.         Yara Salazar MD  03/24/21 6147

## 2021-03-25 NOTE — PLAN OF CARE
Problem: Falls - Risk of:  Goal: Absence of physical injury  Description: Absence of physical injury  3/25/2021 1937 by Andrés Frye RN  Outcome: Ongoing    Problem: Cardiovascular  Goal: Hemodynamic stability  Outcome: Ongoing     Problem: Respiratory  Goal: O2 Sat > 90%  Outcome: Ongoing

## 2021-03-25 NOTE — PROGRESS NOTES
PT alerted RN to shooting pain across her chest.  RN called MT and verified that there were no abnormalities, PT was not having nausea and no chest pain during RN's assessment. Will continue to monitor. PT to call out for any abnormality.

## 2021-03-25 NOTE — PROGRESS NOTES
Assessment complete, see doc flowsheet. Patient resting in bed, call light in reach, bed in lowest position, brake set. Patient denies any needs at this time. Patient encouraged to call if needs arise.     Surya Ling RN

## 2021-03-25 NOTE — OP NOTE
Cardiac Catheterization     Procedure: LHC, Selective graft angiography   Complications: None  Medications: Procedural sedation with minimal conscious sedation (1.5 Versed/75 Fentanyl)  An independent trained observer pushed medications at my direction. We monitored the patient's level of consciousness and vital signs/physiologic status throughout the procedure duration (see start and stop times in log).   Estimated Blood Loss: Less than 20 mL  Specimens: were not obtained  Access: Micropuncture, 5 Fr Left CFA  Closure: Mynx   Contrast:  74 cc  Start time: 6481  Stop time: 1535  Fluoro time: 10.1  Findings:     Left Heart Cath  Dominance: Right      LM: not selected, reported to be chronically occluded   RCA:  Proximal, mid, distal stents patent with a 20% near ostial waist (heavy calcification) ; no significant disease of RPLB or RPDA  LIMA-LAD and SVG-OM widely patent   LVEDP: 12 mmHg     Impression/Recommendations:  Bypass grafts and recent stents patent   Obtain echocardiogram  Expected discharge tomorrow      Elvin Goyal DO, [facility] [place]  Interventional Cardiology     o: [phone]  [facility] [employer], [address] [facility]

## 2021-03-25 NOTE — ED PROVIDER NOTES
[facility]        Pt Name: Sarah Shipman  MRN: [id number]  [place] 1940  Date of evaluation: 3/24/2021  Provider: CARMENZA Mcmahon - CNP  PCP: Maryam Flowers MD     I have seen and evaluated this patient with my supervising physician Naresh Funes MD.    [address] [place] [address]       Chief Complaint   Patient presents with    Chest Pain       HISTORY OF PRESENT ILLNESS   (Location, Timing/Onset, Context/Setting, Quality, Duration, Modifying Factors, Severity, Associated Signs and Symptoms)  Note limiting factors. Sarah Shipman is a 68 y.o. female presents to the emergency department complaining of left sided chest pain without mitigating or exacerbating factors. She describes the pain as heaviness/tightness. Reports that the pain was worse before arriving to the emergency department. Reports it was about a 5 or 6 on a scale of 10. She did take a nitroglycerin, waited an additional 10 minutes and took a second nitroglycerin, then headed to the hospital.  Reports that she has been pain-free since time of arrival.  States that she has history of coronary artery disease and did have cardiac stent placement several weeks ago. Denies any headache, fever, lightheadedness, dizziness, visual disturbances. No neck or back pain. No shortness of breath, cough, or congestion. No abdominal pain, nausea, vomiting, diarrhea, constipation, or dysuria. No rash. Nursing Notes were all reviewed and agreed with or any disagreements were addressed in the HPI. REVIEW OF SYSTEMS    (2-9 systems for level 4, 10 or more for level 5)     Review of Systems   Constitutional: Positive for fatigue. Negative for activity change, chills and fever. Respiratory: Negative for chest tightness and shortness of breath. Cardiovascular: Positive for chest pain. Gastrointestinal: Negative for abdominal pain, diarrhea, nausea and vomiting. Genitourinary: Negative for dysuria. All other systems reviewed and are negative. Positives and Pertinent negatives as per HPI. Except as noted above in the ROS, all other systems were reviewed and negative. PAST MEDICAL HISTORY     Past Medical History:   Diagnosis Date    Asthma     Diabetes mellitus ([address]     Hypercholesteremia     Hypertension     Thyroid disease          SURGICAL HISTORY     Past Surgical History:   Procedure Laterality Date    FOOT SURGERY      LEFT    KNEE ARTHROSCOPY      RIGHT    SHOULDER SURGERY      LEFT         CURRENTMEDICATIONS       Previous Medications    FLUTICASONE-SALMETEROL (ADVAIR HFA) 115-21 MCG/ACT INHALER    Inhale 2 puffs into the lungs daily. FUROSEMIDE (LASIX) 20 MG TABLET    Take 20 mg by mouth 2 times daily. GLIPIZIDE (GLUCOTROL) 2.5 MG CR TABLET    Take 2.5 mg by mouth daily. LEVOTHYROXINE SODIUM 112 MCG CAPS    Take 112 mcg by mouth daily. METFORMIN (GLUCOPHAGE-XR) 500 MG XR TABLET    Take 1,000 mg by mouth daily (with breakfast). MULTIPLE VITAMINS-MINERALS (CENTRUM SILVER PO)    Take  by mouth. POTASSIUM CHLORIDE (KLOR-CON) 10 MEQ CR TABLET    Take 10 mEq by mouth daily. QUINAPRIL (ACCUPRIL) 40 MG TABLET    Take 40 mg by mouth nightly. SIMVASTATIN (ZOCOR) 10 MG TABLET    Take 10 mg by mouth nightly. THEOPHYLLINE CR, 12 HOUR, (THEODUR) 200 MG CR TABLET    Take 200 mg by mouth 2 times daily. VERAPAMIL (COVERA HS) 240 MG (CO) CR TABLET    Take 240 mg by mouth nightly. ALLERGIES     Hydralazine, Penicillins, and Valacyclovir    FAMILYHISTORY     No family history on file.        SOCIAL HISTORY       Social History     Tobacco Use    Smoking status: Never Smoker    Smokeless tobacco: Never Used   Substance Use Topics    Alcohol use: No    Drug use: No       SCREENINGS             PHYSICAL EXAM    (up to 7 for level 4, 8 or more for level 5)     ED Triage Vitals [03/24/21 5682]   BP Temp Temp Source Pulse Resp SpO2 Height Weight   123/63 98.7 °F (37.1 °C) Oral 72 13 99 % -- --       Physical Exam  Vitals signs and nursing note reviewed. Constitutional:       Appearance: She is well-developed. She is not diaphoretic. HENT:      Head: Normocephalic and atraumatic. Right Ear: External ear normal.      Left Ear: External ear normal.   Eyes:      General:         Right eye: No discharge. Left eye: No discharge. Neck:      Musculoskeletal: Normal range of motion and neck supple. Vascular: No JVD. Cardiovascular:      Rate and Rhythm: Normal rate and regular rhythm. Pulses: Normal pulses. Heart sounds: Normal heart sounds. Pulmonary:      Effort: Pulmonary effort is normal. No respiratory distress. Breath sounds: Normal breath sounds. Abdominal:      Palpations: Abdomen is soft. Musculoskeletal: Normal range of motion. Skin:     General: Skin is warm and dry. Coloration: Skin is not pale. Neurological:      Mental Status: She is alert and oriented to person, place, and time.    Psychiatric:         Behavior: Behavior normal.         DIAGNOSTIC RESULTS   LABS:    Labs Reviewed   CBC WITH AUTO DIFFERENTIAL - Abnormal; Notable for the following components:       Result Value    Hemoglobin 11.6 (*)     Eosinophils Absolute 0.7 (*)     All other components within normal limits    Narrative:     Performed at:  [facility] [address], [place], [facility] system   Phone [phone]   COMPREHENSIVE METABOLIC PANEL - Abnormal; Notable for the following components:    Glucose 142 (*)     All other components within normal limits    Narrative:     Performed at:  [facility] [address] [place]   Phone [phone]   TR[place]    Narrative:     Performed at:  [facility] [address],  [place], [address] [employer] system   Phone [phone]   [address] Narrative:     Performed at:  [facility] [address], [address]   Phone [phone]       All other labs were within normal range or not returned as of this dictation. EKG: All EKG's are interpreted by the Emergency Department Physician in the absence of a cardiologist.  Please see their note for interpretation of EKG. RADIOLOGY:   Non-plain film images such as CT, Ultrasound and MRI are read by the radiologist. Plain radiographic images are visualized and preliminarily interpreted by the ED Provider with the below findings:        Interpretation per the Radiologist below, if available at the time of this note:    XR CHEST PORTABLE   Final Result   No radiographic evidence of acute cardiopulmonary disease. No results found. PROCEDURES   Unless otherwise noted below, none     Procedures    CRITICAL CARE TIME   The total critical care time spent while evaluating and treating this patient was at least 32 minutes. This excludes time spent doing separately billable procedures. This includes time at the bedside, data interpretation, medication management, obtaining critical history from collateral sources if the patient is unable to provide it directly, and physician consultation. Specifics of interventions taken and potentially life-threatening diagnostic considerations are listed above in the medical decision making.       CONSULTS:  IP CONSULT TO CARDIOLOGY      EMERGENCY DEPARTMENT COURSE and DIFFERENTIAL DIAGNOSIS/MDM:   Vitals:    Vitals:    03/24/21 2142   BP: 123/63   Pulse: 72   Resp: 13   Temp: 98.7 °F (37.1 °C)   TempSrc: Oral   SpO2: 99%       Patient was given the following medications:  Medications   nitroglycerin (NITRO-BID) 2 % ointment 0.5 inch (0.5 inches Topical Given 3/25/21 0025)           Briefly, this is a 68year old female that presents to the emergency department complaining of left sided chest pain without mitigating or exacerbating factors. She describes the pain as heaviness/tightness. Reports that the pain was worse before arriving to the emergency department. Reports it was about a 5 or 6 on a scale of 10. She did take a nitroglycerin, waited an additional 10 minutes and took a second nitroglycerin, then headed to the hospital.  Reports that she has been pain-free since time of arrival.  States that she has history of coronary artery disease and did have cardiac stent placement several weeks ago. Patient was seen at [facility], Dr. Kali Swenson, [facility]  3/16/2021  · 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- lad and SVG- om bypass grafts  · Severe heavily calcified ostial RCA disease  · Normal LV systolic function ejection fraction 65%  · No mitral regurgitation or aortic valve stenosis  · Mild aortic valve regurgitation  · INTERVENTION: OSTIAL RCA SHOCKWAVE LITHOTRIPSY 3.5 MM BALLOON, WITH STENTING FROM THE MID DISTAL VESSEL BACK TO THE OSTIUM OVERLAPPING 2.5 X 38 AND 3.5 X 20 MM SYNERGY DRUG-ELUTING STENTS, POST DILATED 3.0 AND 4.0 MM    XR CHEST PORTABLE (Final result)  Result time 03/25/21 00:05:43  Final result by Link Frank MD (03/25/21 00:05:43)                Impression:    No radiographic evidence of acute cardiopulmonary disease              Labs are unremarkable. She was given nitroglycerin paste in the ER. Admit for high risk chest pain and ekg changes. hospitalist to admit. Patient and family updated regarding plan. FINAL IMPRESSION      1. Chest pain, unspecified type          DISPOSITION/PLAN   DISPOSITION Admitted 03/24/2021 11:38:22 PM      PATIENT REFERREDTO:  No follow-up provider specified.     DISCHARGE MEDICATIONS:  New Prescriptions    No medications on file       DISCONTINUED MEDICATIONS:  Discontinued Medications    No medications on file              (Please note that portions of this note were completed with a voice recognition program.  Efforts were made to edit the dictations but occasionally words are mis-transcribed.)    CARMENZA Pillai CNP (electronically signed)            CARMENZA Pillai CNP  03/25/21 7082

## 2021-03-25 NOTE — ED NOTES
Pt ambulated to bathroom without assistance and with a steady gait.       Karl Walls RN  03/24/21 5637

## 2021-03-26 VITALS
HEIGHT: 65 IN | RESPIRATION RATE: 17 BRPM | TEMPERATURE: 97.8 F | OXYGEN SATURATION: 98 % | HEART RATE: 63 BPM | WEIGHT: 154.54 LBS | BODY MASS INDEX: 25.75 KG/M2 | DIASTOLIC BLOOD PRESSURE: 72 MMHG | SYSTOLIC BLOOD PRESSURE: 125 MMHG

## 2021-03-26 LAB
ANION GAP SERPL CALCULATED.3IONS-SCNC: 10 MMOL/L (ref 3–16)
BUN BLDV-MCNC: 12 MG/DL (ref 7–20)
CALCIUM SERPL-MCNC: 9.1 MG/DL (ref 8.3–10.6)
CHLORIDE BLD-SCNC: 103 MMOL/L (ref 99–110)
CO2: 25 MMOL/L (ref 21–32)
CREAT SERPL-MCNC: 0.8 MG/DL (ref 0.6–1.2)
GFR AFRICAN AMERICAN: >60
GFR NON-AFRICAN AMERICAN: >60
GLUCOSE BLD-MCNC: 129 MG/DL (ref 70–99)
GLUCOSE BLD-MCNC: 131 MG/DL (ref 70–99)
HCT VFR BLD CALC: 37.9 % (ref 36–48)
HEMOGLOBIN: 12.3 G/DL (ref 12–16)
MCH RBC QN AUTO: 28.3 PG (ref 26–34)
MCHC RBC AUTO-ENTMCNC: 32.5 G/DL (ref 31–36)
MCV RBC AUTO: 87.2 FL (ref 80–100)
PDW BLD-RTO: 13.7 % (ref 12.4–15.4)
PERFORMED ON: ABNORMAL
PLATELET # BLD: 267 K/UL (ref 135–450)
PMV BLD AUTO: 8.8 FL (ref 5–10.5)
POTASSIUM SERPL-SCNC: 4.4 MMOL/L (ref 3.5–5.1)
RBC # BLD: 4.34 M/UL (ref 4–5.2)
SODIUM BLD-SCNC: 138 MMOL/L (ref 136–145)
WBC # BLD: 5.3 K/UL (ref 4–11)

## 2021-03-26 PROCEDURE — 2580000003 HC RX 258: Performed by: HOSPITALIST

## 2021-03-26 PROCEDURE — 85027 COMPLETE CBC AUTOMATED: CPT

## 2021-03-26 PROCEDURE — 94760 N-INVAS EAR/PLS OXIMETRY 1: CPT

## 2021-03-26 PROCEDURE — 96372 THER/PROPH/DIAG INJ SC/IM: CPT

## 2021-03-26 PROCEDURE — 2580000003 HC RX 258: Performed by: INTERNAL MEDICINE

## 2021-03-26 PROCEDURE — 99214 OFFICE O/P EST MOD 30 MIN: CPT | Performed by: NURSE PRACTITIONER

## 2021-03-26 PROCEDURE — 6370000000 HC RX 637 (ALT 250 FOR IP): Performed by: HOSPITALIST

## 2021-03-26 PROCEDURE — 94640 AIRWAY INHALATION TREATMENT: CPT

## 2021-03-26 PROCEDURE — 6360000002 HC RX W HCPCS: Performed by: HOSPITALIST

## 2021-03-26 PROCEDURE — 80048 BASIC METABOLIC PNL TOTAL CA: CPT

## 2021-03-26 PROCEDURE — G0378 HOSPITAL OBSERVATION PER HR: HCPCS

## 2021-03-26 PROCEDURE — 36415 COLL VENOUS BLD VENIPUNCTURE: CPT

## 2021-03-26 RX ADMIN — Medication 10 ML: at 08:26

## 2021-03-26 RX ADMIN — AMLODIPINE BESYLATE 10 MG: 5 TABLET ORAL at 08:25

## 2021-03-26 RX ADMIN — LOSARTAN POTASSIUM 100 MG: 100 TABLET, FILM COATED ORAL at 08:25

## 2021-03-26 RX ADMIN — LEVOTHYROXINE SODIUM 125 MCG: 0.03 TABLET ORAL at 06:07

## 2021-03-26 RX ADMIN — TICAGRELOR 90 MG: 90 TABLET ORAL at 08:25

## 2021-03-26 RX ADMIN — ENOXAPARIN SODIUM 40 MG: 40 INJECTION SUBCUTANEOUS at 08:24

## 2021-03-26 RX ADMIN — Medication 2 PUFF: at 09:00

## 2021-03-26 RX ADMIN — ASPIRIN 81 MG: 81 TABLET, CHEWABLE ORAL at 08:25

## 2021-03-26 RX ADMIN — SPIRONOLACTONE 50 MG: 25 TABLET ORAL at 08:25

## 2021-03-26 ASSESSMENT — PAIN SCALES - GENERAL: PAINLEVEL_OUTOF10: 0

## 2021-03-26 NOTE — PLAN OF CARE
Problem: Falls - Risk of:  Goal: Will remain free from falls  Description: Will remain free from falls  Outcome: Ongoing     Problem: Falls - Risk of:  Goal: Absence of physical injury  Description: Absence of physical injury  3/26/2021 0934 by Jorge Alexander RN  Outcome: Ongoing     Problem: Pain Control  Goal: Maintain pain level at or below patient's acceptable level (or 5 if patient is unable to determine acceptable level)  Outcome: Ongoing     Problem: Pain Control  Goal: Improvement in pain related behaviors BP/HR WNL  Outcome: Ongoing     Problem: Neurological  Goal: Maximum potential motor/sensory/cognitive function  Outcome: Ongoing     Problem: Cardiovascular  Goal: No DVT, peripheral vascular complications  Outcome: Ongoing     Problem: Cardiovascular  Goal: Hemodynamic stability  3/26/2021 0934 by Jorge Alexander RN  Outcome: Ongoing     Problem: Cardiovascular  Goal: Anticoagulate/Hct stable  Outcome: Ongoing     Problem: Cardiovascular  Goal: Agreement to quit smoking  Outcome: Ongoing     Problem: Cardiovascular  Goal: Weight maintained or lost  Outcome: Ongoing     Problem: Cardiovascular  Goal: Understanding of dietary restrictions  Outcome: Ongoing     Problem: Respiratory  Goal: No pulmonary complications  Outcome: Ongoing     Problem: Respiratory  Goal: O2 Sat > 90%  3/26/2021 0934 by Jorge Alexander RN  Outcome: Ongoing     Problem: Respiratory  Goal: Supplemental O2 requirements decreased  Outcome: Ongoing     Problem: Respiratory  Goal: Agreement to quit smoking  Outcome: Ongoing     Problem: Respiratory  Goal: Pneumonia vaccine at discharge as needed  Outcome: Ongoing     Problem: GI  Goal: No bowel complications  Outcome: Ongoing     Problem: GI  Goal: Bowel movement at least every other day  Outcome: Ongoing     Problem:   Goal: Adequate urinary output  Outcome: Ongoing     Problem:   Goal: No urinary complication  Outcome: Ongoing     Problem: Nutrition  Goal: Optimal

## 2021-03-26 NOTE — DISCHARGE INSTR - COC
Continuity of Care Form    Patient Name: Brianne Almanzar   :  1940  MRN:  [id number]    Admit date:  3/24/2021  Discharge date:  ***    Code Status Order: Full Code   Advance Directives:   [facility] Documentation     Date/Time Healthcare Directive Type of Healthcare Directive Copy in [facility] [address] Agent's Name Healthcare Agent's Phone Number    21 0129  No, patient does not have an advance directive for healthcare treatment -- -- -- -- --          Admitting Physician:  Avery Israel MD  PCP: Jailyn Perdomo MD    Discharging Nurse: [facility] Unit/Room#: 7LW-[id number]/6423-28  Discharging Unit Phone Number: ***    Emergency Contact:   Extended Emergency Contact Information  Primary Emergency Contact: Reid Arauz  Address: [address] [place] [address], [address] [place] [address]  Home Phone: [phone]  Work Phone: [phone]  Relation: Spouse  Secondary Emergency Contact: Ileana Epperson  Address: IN AM ONLY  Home Phone: [phone]  Relation: Child    Past Surgical History:  Past Surgical History:   Procedure Laterality Date    FOOT SURGERY      LEFT    KNEE ARTHROSCOPY      RIGHT    SHOULDER SURGERY      LEFT       Immunization History: There is no immunization history on file for this patient.     Active Problems:  Patient Active Problem List   Diagnosis Code    Chest pain at rest R07.9       Isolation/Infection:   Isolation          No Isolation        Patient Infection Status     None to display          Nurse Assessment:  Last Vital Signs: /72   Pulse 63   Temp 97.8 °F (36.6 °C) (Oral)   Resp 17   Ht 5' 5\" (1.651 m)   Wt 154 lb 8.7 oz (70.1 kg)   SpO2 98%   BMI 25.72 kg/m²     Last documented pain score (0-10 scale): Pain Level: 0  Last Weight:   Wt Readings from Last 1 Encounters:   21 154 lb 8.7 oz (70.1 kg)     Mental Status:  {IP PT MENTAL STATUS:}    IV Access:  {[facility] IV ACCESS:623494647} Nursing Mobility/ADLs:  Walking   {CHP DME QLPF:007214817}  Transfer  {CHP DME VJCE:103623475}  Bathing  {CHP DME WPBB:965100896}  Dressing  {CHP DME PPSY:179031378}  Toileting  {CHP DME UFNW:084939757}  Feeding  {CHP DME NKNV:669576091}  Med Admin  {CHP DME FIWR:134451492}  Med Delivery   { RAYA MED Delivery:507604859}    Wound Care Documentation and Therapy:        Elimination:  Continence:   · Bowel: {YES / UL:89616}  · Bladder: {YES / QW:71923}  Urinary Catheter: {Urinary Catheter:215563762}   Colostomy/Ileostomy/Ileal Conduit: {YES / KT:21055}       Date of Last BM: ***    Intake/Output Summary (Last 24 hours) at 3/26/2021 0922  Last data filed at 3/26/2021 0815  Gross per 24 hour   Intake 480 ml   Output 800 ml   Net -320 ml     I/O last 3 completed shifts:   In: 300 [P.O.:300]  Out: 1000 [Urine:1000]    Safety Concerns:     [address] Safety Concerns:671507554}    Impairments/Disabilities:      [address] Impairments/Disabilities:800948584}    Nutrition Therapy:  Current Nutrition Therapy:   [address] Diet List:457706729}    Routes of Feeding: {[facility] DME Other Feedings:540669303}  Liquids: {Slp liquid thickness:50869}  Daily Fluid Restriction: {P DME Yes amt example:699817546}  Last Modified Barium Swallow with Video (Video Swallowing Test): {Done Not Done XEXE:840381949}    Treatments at the Time of Hospital Discharge:   Respiratory Treatments: ***  Oxygen Therapy:  {Therapy; copd oxygen:15731}  Ventilator:    { CC Vent AWGQ:300353432}    Rehab Therapies: {THERAPEUTIC INTERVENTION:4700929320}  Weight Bearing Status/Restrictions: [address] Weight Bearin}  Other Medical Equipment (for information only, NOT a DME order):  {EQUIPMENT:523438121}  Other Treatments: ***    Patient's personal belongings (please select all that are sent with patient):  {CHP DME Belongings:398307474}    RN SIGNATURE:  {Esignature:282588205}    CASE MANAGEMENT/SOCIAL WORK SECTION    Inpatient Status Date: ***    Readmission Risk Assessment Score:  Readmission Risk              Risk of Unplanned Readmission:        0           Discharging to Facility/ Agency   · Name:   · Address:  · Phone:  · Fax:    Dialysis Facility (if applicable)   · Name:  · Address:  · Dialysis Schedule:  · Phone:  · Fax:    / signature: {Esignature:540523084}    PHYSICIAN SECTION    Prognosis: {Prognosis:2357362370}    Condition at Discharge: [facility] Patient Condition:916751335}    Rehab Potential (if transferring to Rehab): {Prognosis:2974609644}    Recommended Labs or Other Treatments After Discharge: ***    Physician Certification: I certify the above information and transfer of Rosalino Wilder  is necessary for the continuing treatment of the diagnosis listed and that she requires {Admit to Appropriate Level of Care:85334} for {GREATER/LESS:641323507} 30 days.      Update Admission H&P: {CHP DME Changes in TFLVA:002249763}    PHYSICIAN SIGNATURE:  {Esignature:339019683}

## 2021-03-26 NOTE — PROGRESS NOTES
Data- discharge order received, pt verbalized agreement to discharge, disposition to previous residence, no needs for HHC/DME. Action- discharge instructions prepared and given to pt and daughter, pt verbalized understanding. Medication information packet given r/t NEW and/or CHANGED prescriptions emphasizing name/purpose/side effects, pt verbalized understanding. Discharge instruction summary: Diet- cardiac carb control, Activity- as tolerated, Primary Care Physician as followsKevin Saldivar -[phone] f/u appointment in one week and with [facility] cardiology on 3/30/2021, immunizations reviewed and updated, prescription medications filled n/a. Inpatient surgical procedure precautions reviewed. Response- Pt belongings gathered, IV removed. Disposition is home (no HHC/DME needs), transported with daughter, taken to lobby via w/c w/ RN, no complications.

## 2021-03-26 NOTE — PROGRESS NOTES
50 mg Oral Daily    losartan  100 mg Oral Daily    amLODIPine  10 mg Oral Daily    levothyroxine  125 mcg Oral Daily    budesonide-formoterol  2 puff Inhalation BID    ticagrelor  90 mg Oral BID    sodium chloride flush  10 mL Intravenous 2 times per day     Continuous Infusions:   dextrose       PRN Meds:sodium chloride flush, promethazine **OR** ondansetron, acetaminophen **OR** acetaminophen, polyethylene glycol, potassium chloride **OR** potassium alternative oral replacement **OR** potassium chloride, magnesium sulfate, nitroGLYCERIN, glucose, dextrose, glucagon (rDNA), dextrose, sodium chloride flush, acetaminophen       Objective: Wt Readings from Last 3 Encounters:   21 154 lb 8.7 oz (70.1 kg)   Admit weight: Weight: 161 lb (73 kg)      Temperature range over 24hrs:   Temp  Av °F (36.7 °C)  Min: 97.7 °F (36.5 °C)  Max: 98.5 °F (36.9 °C)  Current Respiratory Rate:  Resp: 17  Current Pulse:  Pulse: 63  Current Blood Pressure:  BP: 125/72  24hr Blood Pressure Range:  Systolic (70EZM), AWD:522 , Min:117 , NAD:821   ; Diastolic (20XQU), DPZ:09, Min:71, Max:97    Current Pulse Oximetry:  SpO2: 97 % RA      Intake/Output Summary (Last 24 hours) at 3/26/2021 0804  Last data filed at 3/25/2021 1759  Gross per 24 hour   Intake 300 ml   Output 1000 ml   Net -700 ml       Telemetry monitor: sinus rhythm    Physical Exam:  General:  Awake, alert, NAD  Skin:  Warm and dry; Lt groin unremarkable  Neck:  No JVD  Chest:  Clear to auscultation, respiration easy  Cardiovascular:  RRR 78 S1S2 no murmur to auscultation  Abdomen:   Bowel sounds normal, abd soft, non-tender  Extremities:  No edema  : unremarkable      Imaging    Cardiac cath: 3/16/21:   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- lad and SVG-   om bypass grafts  · Severe heavily calcified ostial RCA disease  · Normal LV systolic function ejection fraction 65%  · No mitral regurgitation or aortic valve stenosis  · Mild aortic valve regurgitation  · INTERVENTION: OSTIAL RCA SHOCKWAVE LITHOTRIPSY 3.5 MM BALLOON, WITH   STENTING FROM THE MID DISTAL VESSEL BACK TO THE OSTIUM OVERLAPPING 2.5 X   38 AND 3.5 X 20 MM SYNERGY DRUG-ELUTING STENTS, POST DILATED 3.0 AND 4.0   MM     Left Heart Cath : 3/25/21  Dominance: Right      LM: not selected, reported to be chronically occluded   RCA:  Proximal, mid, distal stents patent with a 20% near ostial waist (heavy calcification) ; no significant disease of RPLB or RPDA  LIMA-LAD and SVG-OM widely patent   LVEDP: 12 mmHg      Impression/Recommendations:  Bypass grafts and recent stents patent   Obtain echocardiogram  Expected discharge tomorrow    Echo: 3/25/21:   Summary   *Technically difficult study due to lung interference. *Left ventricle - septal hypertrophy, normal size and function with EF of 65%   *Aortic valve - sclerotic, mean gradient of 8mmHg, mild regurgitation   *Mitral valve - mild regurgitation   *Tricuspid valve - moderate regurgitation with PASP of 40mmHg    Lab Review     Renal Profile:   Lab Results   Component Value Date    CREATININE 0.8 03/26/2021    BUN 12 03/26/2021     03/26/2021    K 4.4 03/26/2021    K 4.4 03/25/2021     03/26/2021    CO2 25 03/26/2021     CBC:    Lab Results   Component Value Date    WBC 5.3 03/26/2021    RBC 4.34 03/26/2021    HGB 12.3 03/26/2021    HCT 37.9 03/26/2021    MCV 87.2 03/26/2021    RDW 13.7 03/26/2021     03/26/2021     Fasting Lipid Panel:    Lab Results   Component Value Date    CHOL 178 03/25/2021    HDL 45 03/25/2021    TRIG 72 03/25/2021     Cardiac Enzymes:    Lab Results   Component Value Date    TROPONINI <0.01 03/25/2021       Assessment/Plan:     Patient Active Problem List   Diagnosis    Chest pain at rest      1.  CAD   ~offers no c/o chest pain    ~s/p cardiac cath POD #1 : bypass conduits and RCA stent patent    ~septal hypertrophy noted on echo, EF 65%   ~hx of CABG with IABP March '14   ~DAPT    ~no BB d/t hx bradycardia (HR 40s)    2. HTN   ~controlled   ~amlodipine / losartan / aldactone    3.  Mixed hyperlipidemia   ~LDL elevated : 133 and 119 on last two profiles; goal < 70   ~intolerant to statins ; taking zetia   ~primary cardiologist submitting for [facility]    Plan: ok for discharge and f/u with primary cardiology NP, Trevor Kirk, on 3/30 as scheduled

## 2021-03-26 NOTE — CARE COORDINATION
Patient admitted as Observation with an anticipated short hospitalization length of stay. Chart reviewed and it appears that patient has minimal needs for discharge at this time. Discussed with patients nurse and requested that case management be notified if discharge needs are identified. *Case management will continue to follow progress and update discharge plan as needed. Pt discharging today. Chart reviewed and no d/c needs identified.       Electronically signed by BRIAN Nieves, MAGUE on 3/26/2021 at 8:24 AM

## 2021-03-26 NOTE — PROGRESS NOTES
Post procedure site check completed. Surgical site is within normal limits - small hematomas on either side (right worse than left) - patient states no pain or discomfort. No bruising. All concerns were reviewed and questions answered. Patient verbalized understanding.

## 2021-03-30 NOTE — DISCHARGE SUMMARY
[facility]    Patient Demographics    Patient. Gemma Crockett  Date of Birth. 1940  MRN. [id number]     Primary care provider. Didi Haynes MD  (Tel: [phone])    Admit date: 3/24/2021    Discharge date (blank if same as Note Date): 3/26/2021  Note Date: 3/30/2021     Reason for Hospitalization. Chief Complaint   Patient presents with    Chest Pain         Significant Findings. Active Problems:    Chest pain at rest  Resolved Problems:    * No resolved hospital problems. *       Problems and results from this hospitalization that need follow up. 1. Chest pain    Significant test results and incidental findings. 1. ECHO   Summary   *Technically difficult study due to lung interference. *Left ventricle - septal hypertrophy, normal size and function with EF of   65%   *Aortic valve - sclerotic, mean gradient of 8mmHg, mild regurgitation   *Mitral valve - mild regurgitation   *Tricuspid valve - moderate regurgitation with PASP of 40mmHg    Left Heart Cath  Dominance: Right      LM: not selected, reported to be chronically occluded   RCA:  Proximal, mid, distal stents patent with a 20% near ostial waist (heavy calcification) ; no significant disease of RPLB or RPDA  LIMA-LAD and SVG-OM widely patent   LVEDP: 12 mmHg     Invasive procedures and treatments. 1. L heart cath by Dr Marilin Dhillon on     [address] [place] [address] Dr Black. Patient is a 67 yo female who presented to the hospital with chest pain that radiated to the left axilla. She recently had PCA on 3/16 at [facility]. She has been compliant with her dual antiplatelets. Initial cardiac workup was negative. Patient was admitted, and seen by cardiology. She was taken to the cath lab and found to have patent bypass grafts and stents. She had an echo and was discharged home in stable condition. Consults.   IP CONSULT TO CARDIOLOGY    Physical examination on discharge day. /72   Pulse 63   Temp 97.8 °F (36.6 °C) (Oral)   Resp 17   Ht 5' 5\" (1.651 m)   Wt 154 lb 8.7 oz (70.1 kg)   SpO2 98%   BMI 25.72 kg/m²   General appearance. Alert. Looks comfortable. HEENT. Sclera clear. Moist mucus membranes. Cardiovascular. Regular rate and rhythm, normal S1, S2. No murmur. Respiratory. Not using accessory muscles. Clear to auscultation bilaterally, no wheeze. Gastrointestinal. Abdomen soft, non-tender, not distended, normal bowel sounds  Neurology. Facial symmetry. No speech deficits. Moving all extremities equally. Extremities. No edema in lower extremities. Skin. Warm, dry, normal turgor    Condition at time of discharge stable    Medication instructions provided to patient at discharge.      Medication List      CONTINUE taking these medications    amLODIPine 10 MG tablet  Commonly known as: NORVASC  Notes to patient: Use: Calcium channel blocker used to treat high blood pressure and chest pain  Side effects: Headache, swelling, dizziness, light headiness, nausea & upset stomach     aspirin 81 MG chewable tablet  Notes to patient: Thins blood to prevent clotting, ie heart attack or stroke  Side effects: may cause extra bruising or bleeding      ezetimibe 10 MG tablet  Commonly known as: Estrella Farley  Notes to patient: Lowers high cholesterol levels  Side effects: muscle pain, tenderness, weakness     fluticasone-salmeterol 115-21 MCG/ACT inhaler  Commonly known as: ADVAIR HFA  Notes to patient: Use: to treat asthma or to treat COPD (chronic obstructive pulmonary disease)  Side effects: headache, throat irritation, signs of common cold     glipiZIDE 2.5 MG extended release tablet  Commonly known as: GLUCOTROL XL  Notes to patient: Use: it is used to lower blood sugar in patients with high blood sugar  Side effects: dizziness, diarrhea, gas     Levothyroxine Sodium 112 MCG Caps  Notes to patient: Use: increases thyroid stimulating hormone  Side effects: weight loss, nervousness   For best results: take on an empty stomach, 30 minutes prior to eating     losartan 100 MG tablet  Commonly known as: COZAAR  Notes to patient: Angiotensin II Receptor Blockers (ARBS)  Use: Opens blood vessels around the heart, lowering blood pressure  Treats: High blood pressure by dilating blood vessels to increase blood flow. Side effects: Dry cough, dizziness, drowsiness & sensitivity to the sun may occur. Also changes in taste (metallic or salty) & elevated potassium & are possible. melatonin 3 MG Tabs tablet  Notes to patient: Use: Help regulate sleep  Side effects: Headache, loose stools, dizziness     spironolactone 50 MG tablet  Commonly known as: ALDACTONE  Notes to patient: Use: to get rid of extra fluid or to treat high blood pressure  Side effects: frequent urination, dizziness, upset stomach     ticagrelor 90 MG Tabs tablet  Commonly known as: BRILINTA  Notes to patient: Use: to lower the chance of heart attack or stroke  Side effects: upset stomach, easy to bruise     vitamin B-12 1000 MCG tablet  Commonly known as: CYANOCOBALAMIN  Notes to patient: Use: dietary supplement  Side effects: upset stomach, nausea     Vitamin D3 50 MCG (2000 UT) Caps  Notes to patient: Use: Dietary supplement, helps absorb calcium better. Side effects: upset stomach, nausea        STOP taking these medications    CENTRUM SILVER PO     furosemide 20 MG tablet  Commonly known as: LASIX     metFORMIN 500 MG extended release tablet  Commonly known as: GLUCOPHAGE-XR     potassium chloride 10 MEQ CR tablet  Commonly known as: KLOR-CON     quinapril 40 MG tablet  Commonly known as: ACCUPRIL     simvastatin 10 MG tablet  Commonly known as: ZOCOR     theophylline 200 MG extended release tablet  Commonly known as: THEODUR     verapamil 240 MG extended release tablet  Commonly known as: COVERA HS            Discharge recommendations given to patient.   Follow Up. pcp

## 2021-04-27 ENCOUNTER — HOSPITAL ENCOUNTER (OUTPATIENT)
Dept: CARDIAC REHAB | Age: 81
Setting detail: THERAPIES SERIES
Discharge: HOME | End: 2021-04-27
Payer: MEDICARE

## 2021-04-27 PROCEDURE — 93798 PHYS/QHP OP CAR RHAB W/ECG: CPT

## 2021-04-27 ASSESSMENT — PATIENT HEALTH QUESTIONNAIRE - PHQ9
SUM OF ALL RESPONSES TO PHQ QUESTIONS 1-9: 7
SUM OF ALL RESPONSES TO PHQ QUESTIONS 1-9: 7

## 2021-04-27 NOTE — PROGRESS NOTES
[facility] Cardiac Rehabilitation Initial Evaluation    Tish Sanabria       1940     [id number]    Share medical information:  Yes  Clearance [phone]    Cardiac History    PTCA Stent    Physical Assessment     General Appearance   Height: 5'5     IRHKQP695.0:      BMI:     Skin color:    Skin Color: Appropriate for ethnicity    Cardiovascular Assessment  BP Sittin/62     Sitting:     Standin/64  Heart rate:         Heart sounds:     S1, S2    Respiratory Assessment  Resp rate:       Regular  Normal  L Breath Sounds: Clear   R Breath Sounds: Clear  SpO2:     Quality/Effort:   Unlabored  Sleep Apnea:  No  CPAP  No  Oxygen  No     Sleeping Habits:  Takes melatonin, hard time getting to sleep and staying asleep    Edema:  No      Orthopedic/Exercise Limitations:  No    Pain:   Do you have pain?:  no       Fall Risk Assessment     History of falling with or without injury: No  Use of ambulatory aid: No  Difficulty walking/impaired gait: No  Numbness in feet: Yes  Vision changes: No  Dizziness: No  Shortness of breath: No  Current medications include but not limited to:  Anticoagulant, ACE, ARB, Beta  Blocker     Outpatient fall risk intervention strategies: None of these strategies apply    Abuse / Neglect  Physical/behavioral signs of abuse/neglect   No    Do you feel safe at home   Yes    Advanced Directives  Patient has Advanced Directives:  No  Patient given Advanced Directive pack:  Declined    Vaccinations  Influenza (annual):  Yes  Pneumonia:  Yes    Erika Avila RN  2021

## 2021-04-29 ENCOUNTER — HOSPITAL ENCOUNTER (OUTPATIENT)
Dept: CARDIAC REHAB | Age: 81
Setting detail: THERAPIES SERIES
Discharge: HOME | End: 2021-04-29
Payer: MEDICARE

## 2021-04-29 PROCEDURE — 93798 PHYS/QHP OP CAR RHAB W/ECG: CPT

## 2021-05-04 ENCOUNTER — HOSPITAL ENCOUNTER (OUTPATIENT)
Dept: CARDIAC REHAB | Age: 81
Setting detail: THERAPIES SERIES
Discharge: HOME | End: 2021-05-04
Payer: MEDICARE

## 2021-05-04 PROCEDURE — 93798 PHYS/QHP OP CAR RHAB W/ECG: CPT

## 2021-05-06 ENCOUNTER — HOSPITAL ENCOUNTER (OUTPATIENT)
Dept: CARDIAC REHAB | Age: 81
Setting detail: THERAPIES SERIES
Discharge: HOME | End: 2021-05-06
Payer: MEDICARE

## 2021-05-06 PROCEDURE — 93798 PHYS/QHP OP CAR RHAB W/ECG: CPT

## 2021-05-11 ENCOUNTER — HOSPITAL ENCOUNTER (OUTPATIENT)
Dept: CARDIAC REHAB | Age: 81
Setting detail: THERAPIES SERIES
Discharge: HOME | End: 2021-05-11
Payer: MEDICARE

## 2021-05-11 PROCEDURE — 93798 PHYS/QHP OP CAR RHAB W/ECG: CPT

## 2021-05-18 ENCOUNTER — HOSPITAL ENCOUNTER (OUTPATIENT)
Dept: CARDIAC REHAB | Age: 81
Setting detail: THERAPIES SERIES
Discharge: HOME | End: 2021-05-18
Payer: MEDICARE

## 2021-05-18 PROCEDURE — 93798 PHYS/QHP OP CAR RHAB W/ECG: CPT

## 2021-05-20 ENCOUNTER — HOSPITAL ENCOUNTER (OUTPATIENT)
Dept: CARDIAC REHAB | Age: 81
Setting detail: THERAPIES SERIES
Discharge: HOME | End: 2021-05-20
Payer: MEDICARE

## 2021-05-20 PROCEDURE — 93798 PHYS/QHP OP CAR RHAB W/ECG: CPT

## 2021-05-25 ENCOUNTER — APPOINTMENT (OUTPATIENT)
Dept: CARDIAC REHAB | Age: 81
End: 2021-05-25
Payer: MEDICARE

## 2021-05-27 ENCOUNTER — HOSPITAL ENCOUNTER (OUTPATIENT)
Dept: CARDIAC REHAB | Age: 81
Setting detail: THERAPIES SERIES
Discharge: HOME | End: 2021-05-27
Payer: MEDICARE

## 2021-05-27 PROCEDURE — 93798 PHYS/QHP OP CAR RHAB W/ECG: CPT

## 2021-06-01 ENCOUNTER — HOSPITAL ENCOUNTER (OUTPATIENT)
Dept: CARDIAC REHAB | Age: 81
Setting detail: THERAPIES SERIES
Discharge: HOME | End: 2021-06-01
Payer: MEDICARE

## 2021-06-01 PROCEDURE — 93798 PHYS/QHP OP CAR RHAB W/ECG: CPT

## 2021-06-03 ENCOUNTER — HOSPITAL ENCOUNTER (OUTPATIENT)
Dept: CARDIAC REHAB | Age: 81
Setting detail: THERAPIES SERIES
Discharge: HOME | End: 2021-06-03
Payer: MEDICARE

## 2021-06-03 PROCEDURE — 93798 PHYS/QHP OP CAR RHAB W/ECG: CPT

## 2021-06-08 ENCOUNTER — APPOINTMENT (OUTPATIENT)
Dept: CARDIAC REHAB | Age: 81
End: 2021-06-08
Payer: MEDICARE

## 2021-06-10 ENCOUNTER — HOSPITAL ENCOUNTER (OUTPATIENT)
Dept: CARDIAC REHAB | Age: 81
Setting detail: THERAPIES SERIES
Discharge: HOME | End: 2021-06-10
Payer: MEDICARE

## 2021-06-10 PROCEDURE — 93798 PHYS/QHP OP CAR RHAB W/ECG: CPT

## 2021-06-15 ENCOUNTER — HOSPITAL ENCOUNTER (OUTPATIENT)
Dept: CARDIAC REHAB | Age: 81
Setting detail: THERAPIES SERIES
Discharge: HOME | End: 2021-06-15
Payer: MEDICARE

## 2021-06-15 PROCEDURE — 93798 PHYS/QHP OP CAR RHAB W/ECG: CPT

## 2021-06-17 ENCOUNTER — HOSPITAL ENCOUNTER (OUTPATIENT)
Dept: CARDIAC REHAB | Age: 81
Setting detail: THERAPIES SERIES
Discharge: HOME | End: 2021-06-17
Payer: MEDICARE

## 2021-06-17 PROCEDURE — 93798 PHYS/QHP OP CAR RHAB W/ECG: CPT

## 2021-06-22 ENCOUNTER — HOSPITAL ENCOUNTER (OUTPATIENT)
Dept: CARDIAC REHAB | Age: 81
Setting detail: THERAPIES SERIES
Discharge: HOME | End: 2021-06-22
Payer: MEDICARE

## 2021-06-22 PROCEDURE — 93798 PHYS/QHP OP CAR RHAB W/ECG: CPT

## 2021-06-24 ENCOUNTER — HOSPITAL ENCOUNTER (OUTPATIENT)
Dept: CARDIAC REHAB | Age: 81
Setting detail: THERAPIES SERIES
Discharge: HOME | End: 2021-06-24
Payer: MEDICARE

## 2021-06-24 PROCEDURE — 93798 PHYS/QHP OP CAR RHAB W/ECG: CPT

## 2021-06-29 ENCOUNTER — APPOINTMENT (OUTPATIENT)
Dept: CARDIAC REHAB | Age: 81
End: 2021-06-29
Payer: MEDICARE

## 2022-06-03 ENCOUNTER — APPOINTMENT (OUTPATIENT)
Dept: CT IMAGING | Age: 82
End: 2022-06-03
Payer: MEDICARE

## 2022-06-03 ENCOUNTER — HOSPITAL ENCOUNTER (EMERGENCY)
Age: 82
Discharge: HOME | End: 2022-06-03
Payer: MEDICARE

## 2022-06-03 ENCOUNTER — APPOINTMENT (OUTPATIENT)
Dept: GENERAL RADIOLOGY | Age: 82
End: 2022-06-03
Payer: MEDICARE

## 2022-06-03 VITALS
HEIGHT: 65 IN | SYSTOLIC BLOOD PRESSURE: 142 MMHG | RESPIRATION RATE: 16 BRPM | TEMPERATURE: 98.2 F | BODY MASS INDEX: 25.83 KG/M2 | OXYGEN SATURATION: 98 % | DIASTOLIC BLOOD PRESSURE: 72 MMHG | WEIGHT: 155 LBS | HEART RATE: 75 BPM
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
## 2022-06-03 DIAGNOSIS — S49.91XA INJURY OF RIGHT SHOULDER, INITIAL ENCOUNTER: ICD-10-CM

## 2022-06-03 DIAGNOSIS — S01.81XA FACIAL LACERATION, INITIAL ENCOUNTER: ICD-10-CM

## 2022-06-03 PROCEDURE — 6370000000 HC RX 637 (ALT 250 FOR IP): Performed by: PHYSICIAN ASSISTANT

## 2022-06-03 PROCEDURE — 70450 CT HEAD/BRAIN W/O DYE: CPT

## 2022-06-03 PROCEDURE — 99284 EMERGENCY DEPT VISIT MOD MDM: CPT

## 2022-06-03 PROCEDURE — 72125 CT NECK SPINE W/O DYE: CPT

## 2022-06-03 PROCEDURE — 73030 X-RAY EXAM OF SHOULDER: CPT

## 2022-06-03 PROCEDURE — 2580000003 HC RX 258

## 2022-06-03 PROCEDURE — 70486 CT MAXILLOFACIAL W/O DYE: CPT

## 2022-06-03 RX ORDER — ACETAMINOPHEN 325 MG/1
650 TABLET ORAL ONCE
Status: COMPLETED | OUTPATIENT
Start: 2022-06-03 | End: 2022-06-03

## 2022-06-03 RX ADMIN — WATER 1 ML: 1 INJECTION INTRAMUSCULAR; INTRAVENOUS; SUBCUTANEOUS at 14:52

## 2022-06-03 RX ADMIN — ACETAMINOPHEN 650 MG: 325 TABLET ORAL at 14:46

## 2022-06-03 ASSESSMENT — ENCOUNTER SYMPTOMS
VOMITING: 0
EYE DISCHARGE: 0
PHOTOPHOBIA: 0
CHEST TIGHTNESS: 0
ABDOMINAL PAIN: 0
BACK PAIN: 0
NAUSEA: 0
DIARRHEA: 0
COUGH: 0
SHORTNESS OF BREATH: 0
EYE PAIN: 0
RHINORRHEA: 0

## 2022-06-03 ASSESSMENT — PAIN SCALES - GENERAL
PAINLEVEL_OUTOF10: 2
PAINLEVEL_OUTOF10: 7
PAINLEVEL_OUTOF10: 10

## 2022-06-03 ASSESSMENT — PAIN DESCRIPTION - LOCATION: LOCATION: FACE;NECK

## 2022-06-03 ASSESSMENT — PAIN - FUNCTIONAL ASSESSMENT: PAIN_FUNCTIONAL_ASSESSMENT: 0-10

## 2022-06-03 NOTE — ED PROVIDER NOTES
[address]        Pt Name: Rod Esqueda  MRN: [id number]  [place] 1940  Date of evaluation: 6/3/2022  Provider: Ramiro Saba PA-C  PCP: Collette Jaramillo MD  Note Started: 1:41 PM EDT       KARIN. I have evaluated this patient. My supervising physician was available for consultation. CHIEF COMPLAINT       Chief Complaint   Patient presents with    Fall     pt states she was in her garage and tripped and fell, hitting the right side of her face, pt c/o neck and head pain, pt takes baby asa, denies LOC       HISTORY OF PRESENT ILLNESS   (Location, Timing/Onset, Context/Setting, Quality, Duration, Modifying Factors, Severity, Associated Signs and Symptoms)  Note limiting factors. Chief Complaint: leland Esqueda is a [de-identified] y.o. female who presents for evaluation of fall. She states today around 12 pm she was working in her garage when she reached forward to  a can, lost her balance and fell forward. The patient states that she hit her head and shoulder first. She now complains of head, neck, and right shoulder pain. She describes her head pain as dull and constant, her neck pain as sharp with movement, and a sharp pain over her right clavicle. She states that it hurts to move her neck side to side and that her right collarbone is hurting her as well. The patient denies loss of consciousness, fatigue, vision changes, memory loss, slurred speech, decreased ROM. States fall was strictly mechanical and denies any other injuries or complaints at this time. Nursing Notes were all reviewed and agreed with or any disagreements were addressed in the HPI. REVIEW OF SYSTEMS    (2-9 systems for level 4, 10 or more for level 5)     Review of Systems   Constitutional: Negative for appetite change, chills, fatigue and fever. HENT: Negative for congestion, ear discharge, ear pain, nosebleeds, rhinorrhea and tinnitus. Eyes: Negative for photophobia, pain, discharge and visual disturbance. Respiratory: Negative for cough, chest tightness and shortness of breath. Cardiovascular: Negative for chest pain. Gastrointestinal: Negative for abdominal pain, diarrhea, nausea and vomiting. Genitourinary: Negative for difficulty urinating, dysuria, frequency, hematuria and pelvic pain. Musculoskeletal: Positive for myalgias and neck pain. Negative for back pain and neck stiffness. Skin: Positive for wound. Negative for rash. Neurological: Positive for headaches. Negative for dizziness, syncope, weakness and light-headedness. Positives and Pertinent negatives as per HPI. Except as noted above in the ROS, all other systems were reviewed and negative. PAST MEDICAL HISTORY     Past Medical History:   Diagnosis Date    Asthma     CAD (coronary artery disease)     Diabetes mellitus ([facility] [address]     Hypercholesteremia     Hypertension     Thyroid disease          SURGICAL HISTORY     Past Surgical History:   Procedure Laterality Date    CARDIAC SURGERY      COLONOSCOPY      FOOT SURGERY      LEFT    HYSTERECTOMY      KNEE ARTHROSCOPY      RIGHT    SHOULDER SURGERY      LEFT    VASCULAR SURGERY           CURRENTMEDICATIONS       Previous Medications    AMLODIPINE (NORVASC) 10 MG TABLET    Take 10 mg by mouth daily    ASPIRIN 81 MG CHEWABLE TABLET    Take 81 mg by mouth daily    CHOLECALCIFEROL (VITAMIN D3) 50 MCG (2000 UT) CAPS    Take by mouth    EZETIMIBE (ZETIA) 10 MG TABLET    Take 10 mg by mouth daily    FLUTICASONE-SALMETEROL (ADVAIR HFA) 115-21 MCG/ACT INHALER    Inhale 2 puffs into the lungs daily. GLIPIZIDE (GLUCOTROL) 2.5 MG CR TABLET    Take 2.5 mg by mouth daily.     LEVOTHYROXINE SODIUM 112 MCG CAPS    Take 125 mcg by mouth daily     LOSARTAN (COZAAR) 100 MG TABLET    Take 100 mg by mouth daily    MELATONIN 3 MG TABS TABLET    Take 10 mg by mouth nightly as needed    SPIRONOLACTONE (ALDACTONE) 50 MG TABLET    Take 50 mg by mouth daily    TICAGRELOR (BRILINTA) 90 MG TABS TABLET    Take 90 mg by mouth 2 times daily    VITAMIN B-12 (CYANOCOBALAMIN) 1000 MCG TABLET    Take 1,000 mcg by mouth daily         ALLERGIES     Hydralazine, Penicillins, and Valacyclovir    FAMILYHISTORY       Family History   Problem Relation Age of Onset    Heart Disease Mother     High Blood Pressure Mother     Diabetes Mother     Cancer Father           SOCIAL HISTORY       Social History     Tobacco Use    Smoking status: Never Smoker    Smokeless tobacco: Never Used   Substance Use Topics    Alcohol use: No    Drug use: No       SCREENINGS    Raven Coma Scale  Eye Opening: Spontaneous  Best Verbal Response: Oriented  Best Motor Response: Obeys commands  Efraín Coma Scale Score: 15        PHYSICAL EXAM    (up to 7 for level 4, 8 or more for level 5)     ED Triage Vitals [06/03/22 1256]   BP Temp Temp src Heart Rate Resp SpO2 Height Weight   (!) 150/80 98.2 °F (36.8 °C) -- 85 18 99 % 5' 5\" (1.651 m) 155 lb (70.3 kg)       Physical Exam  Vitals and nursing note reviewed. Constitutional:       General: She is not in acute distress. Appearance: She is well-developed. She is not ill-appearing, toxic-appearing or diaphoretic. HENT:      Head: Normocephalic. Abrasion and contusion present. No raccoon eyes, Arriola's sign or laceration. Right Ear: External ear normal.      Left Ear: External ear normal.      Nose: Nose normal.   Eyes:      General:         Right eye: No discharge. Left eye: No discharge. Cardiovascular:      Rate and Rhythm: Normal rate and regular rhythm. Heart sounds: Normal heart sounds. Pulmonary:      Effort: Pulmonary effort is normal. No respiratory distress. Breath sounds: Normal breath sounds. Chest:      Chest wall: No tenderness. Abdominal:      General: There is no distension. Palpations: Abdomen is soft. Tenderness: There is no abdominal tenderness. below, none     Procedures    CRITICAL CARE TIME       CONSULTS:  None      EMERGENCY DEPARTMENT COURSE and DIFFERENTIAL DIAGNOSIS/MDM:   Vitals:    Vitals:    06/03/22 1256 06/03/22 1400 06/03/22 1500   BP: (!) 150/80 139/69 (!) 142/72   Pulse: 85 75    Resp: 18 16    Temp: 98.2 °F (36.8 °C)     SpO2: 99% 100% 98%   Weight: 155 lb (70.3 kg)     Height: 5' 5\" (1.651 m)         Patient was given the following medications:  Medications   acetaminophen (TYLENOL) tablet 650 mg (650 mg Oral Given 6/3/22 1446)   sterile water injection (1 mL  Given 6/3/22 1452)         Is this patient to be included in the SEP-1 Core Measure due to severe sepsis or septic shock? No   Exclusion criteria - the patient is NOT to be included for SEP-1 Core Measure due to: Infection is not suspected    Patient presents for evaluation of injury after mechanical fall. On exam, she is resting comfortably in bed no acute distress and nontoxic. Vitals are stable and she is afebrile. She is alert and oriented x3. GCS 15. Cranial nerves II through XII are intact. She does have contusion and skin tear to right forehead as well as contusion and abrasion to right cheek and lower periorbital area. There is no large laceration requiring repair. No step-offs crepitus or bogginess. Remainder of scalp is atraumatic. She does have right-sided paraspinous and midline tenderness to cervical region as well as subjective pain in the right shoulder with range of motion. No bony step-offs crepitus obvious deformity or dislocation. Range of motion is decreased secondary to pain but she is neurovascularly intact distally. Lungs are clear to auscultation bilateral chest is nontender and abdomen is benign. She was given Tylenol for symptomatic relief. Tetanus up-to-date. She will be reevaluated. CT of the head shows no acute intracranial normality. CT of the cervical spine shows no acute abnormality.   CT of the facial bones does show a right periorbital contusion and trace fluid in the right maxillary sinus. Globe appears intact. No evidence of entrapment. X-ray of the right shoulder is unremarkable. Symptomatic, supportive and wound care discussed. I estimate there is LOW risk for SKULL FRACTURE, INTRACRANIAL HEMORRHAGE, CERVICAL SPINE INJURY, SUBDURAL OR EPIDURAL HEMATOMA,  thus I consider the discharge disposition reasonable. The history and physical exam suggests a soft tissue source for pain such as muscles, tendons, nerve irritation, etc. I estimate there is LOW risk for CAUDA EQUINA or CENTRAL CORD SYNDROME, EPIDURAL MASS LESION OR ABSCESS, ARTERIAL DISSECTION, MENINGITIS, FRACTURE, CORD COMPRESSION, OR SEVERE SPINAL STENOSIS,  thus I consider the discharge disposition reasonable. Patient is advised to follow-up with their family doctor within the next 24-48 hours and return to the emergency department with any concerns. Patient and family are agreeable to this plan and she is stable for discharge at this time. FINAL IMPRESSION      1. Fall, initial encounter    2. Facial laceration, initial encounter    3.  Injury of right shoulder, initial encounter          DISPOSITION/PLAN   DISPOSITION Decision To Discharge 06/03/2022 02:40:25 PM      PATIENT REFERRED TO:  Adam Salazar MD  [address] [place] [address]  [phone]    Call   For a re-check in  2-3  days    [facility] Emergency Department  [address]  Go to   If symptoms worsen      DISCHARGE MEDICATIONS:  New Prescriptions    No medications on file       DISCONTINUED MEDICATIONS:  Discontinued Medications    No medications on file              (Please note that portions of this note were completed with a voice recognition program.  Efforts were made to edit the dictations but occasionally words are mis-transcribed.)    Polo Ribeiro PA-C (electronically signed)            Amisha Tejeda SKYLAR  06/03/22 151

## 2023-06-20 ENCOUNTER — APPOINTMENT (OUTPATIENT)
Dept: PHYSICAL THERAPY | Age: 83
End: 2023-06-20
Payer: MEDICARE

## 2023-06-23 ENCOUNTER — APPOINTMENT (OUTPATIENT)
Dept: PHYSICAL THERAPY | Age: 83
End: 2023-06-23
Payer: MEDICARE

## 2023-06-26 ENCOUNTER — HOSPITAL ENCOUNTER (OUTPATIENT)
Dept: PHYSICAL THERAPY | Age: 83
Setting detail: THERAPIES SERIES
Discharge: HOME | End: 2023-06-26
Payer: MEDICARE

## 2023-06-26 PROCEDURE — 97112 NEUROMUSCULAR REEDUCATION: CPT

## 2023-06-26 PROCEDURE — 97110 THERAPEUTIC EXERCISES: CPT

## 2023-06-26 PROCEDURE — 97140 MANUAL THERAPY 1/> REGIONS: CPT

## 2023-06-29 ENCOUNTER — HOSPITAL ENCOUNTER (OUTPATIENT)
Dept: PHYSICAL THERAPY | Age: 83
Setting detail: THERAPIES SERIES
Discharge: HOME | End: 2023-06-29
Payer: MEDICARE

## 2023-06-29 PROCEDURE — 97110 THERAPEUTIC EXERCISES: CPT

## 2023-06-29 PROCEDURE — 97140 MANUAL THERAPY 1/> REGIONS: CPT

## 2023-06-29 PROCEDURE — 97112 NEUROMUSCULAR REEDUCATION: CPT

## 2023-07-03 ENCOUNTER — HOSPITAL ENCOUNTER (OUTPATIENT)
Dept: PHYSICAL THERAPY | Age: 83
Setting detail: THERAPIES SERIES
Discharge: HOME | End: 2023-07-03
Payer: MEDICARE

## 2023-07-03 PROCEDURE — 97140 MANUAL THERAPY 1/> REGIONS: CPT

## 2023-07-03 PROCEDURE — 97110 THERAPEUTIC EXERCISES: CPT

## 2023-07-03 NOTE — FLOWSHEET NOTE
kinesthetic sense, posture, motor skill, proprioception of core, proximal hip and LE for self care, mobility, lifting, and ambulation      Manual Treatments:  PROM / STM / Oscillations-Mobs:  G-I, II, III, IV (PA's, Inf., Post.)  [x] (98113) Provided manual therapy to mobilize proximal hip and LS spine soft tissue/joints for the purpose of modulating pain, promoting relaxation,  increasing ROM, reducing/eliminating soft tissue swelling/inflammation/restriction, improving soft tissue extensibility and allowing for proper ROM for normal function with self care, mobility, lifting and ambulation. Charges:  Timed Code Treatment Minutes: 38   Total Treatment Minutes: 38       [] EVAL - LOW (10502)   [] EVAL - MOD (16094)  [] EVAL - HIGH (19141)  [] RE-EVAL (81620)  [x] SI(16179) x 2      [] Ionto  [] NMR (96848) x       [] Vaso  [x] Manual (82476) x  1     [] Ultrasound  [] TA x       [] Mech Traction (27637)  [] Aquatic Therapy x     [] ES (un) (25902):   [] Home Management Training x  [] ES(attended) (07184)   [] Dry Needling 1-2 muscles (04523):  [] Dry Needling 3+ muscles (145869  [] Group:      [] Other:     Goals:   Patient stated goal: \"get back to my normal life\"  [] Progressing: [] Met: [] Not Met: [] Adjusted    Therapist goals for Patient:   Short Term Goals: To be achieved in: 2 weeks  1. Independent in HEP and progression per patient tolerance, in order to prevent re-injury. [] Progressing: [] Met: [] Not Met: [] Adjusted  2. Patient will have a decrease in pain to facilitate improvement in movement, function, and ADLs as indicated by Functional Deficits. [] Progressing: [] Met: [] Not Met: [] Adjusted    Long Term Goals: To be achieved in: 8 weeks  1. Pt will improve DARIN by 10 points to reduce disability and progress towards PLOF. [] Progressing: [] Met: [] Not Met: [] Adjusted  2.  Patient will demonstrate increased AROM to WNL, good LS mobility, good hip ROM to allow for proper joint functioning

## 2023-07-06 ENCOUNTER — HOSPITAL ENCOUNTER (OUTPATIENT)
Dept: PHYSICAL THERAPY | Age: 83
Setting detail: THERAPIES SERIES
Discharge: HOME | End: 2023-07-06
Payer: MEDICARE

## 2023-07-06 PROCEDURE — 97110 THERAPEUTIC EXERCISES: CPT

## 2023-07-06 PROCEDURE — 97140 MANUAL THERAPY 1/> REGIONS: CPT

## 2023-07-06 NOTE — FLOWSHEET NOTE
[address]  Phone: [phone]   Fax: [phone]    Physical Therapy Daily Treatment Note    Date:  2023     Patient Name:  Yakov Mcgrath    :  1940  MRN: [id number]  Medical Diagnosis:  Spinal stenosis, lumbar region without neurogenic claudication [M48.061]  Low back pain, unspecified [M54.50]  Pain in unspecified hip [M25.559]  Spondylolisthesis, lumbar region [M43.16]  Treatment Diagnosis: impaired posture, core control, balance, transfers, and gait, dec lumbar ROM, dec hip flexor and HS flexibility, (+) neural tension L LE  Insurance/Certification information:  PT Insurance Information: [employer] medicare - $40 copay, auth thru [employer]  Physician Information:  Jaylan Trujillo [facility], VARSHA    Plan of care signed (Y/N): []  Yes [x]  No     Date of Patient follow up with Physician:      Progress Report: []  Yes  [x]  No     Date Range for reporting period:  Beginnin2023  Ending:     Progress report due (10 Rx/or 30 days whichever is less): visit #10 or  (date)     Recertification due (POC duration/ or 90 days whichever is less): visit #16 or 23 (date)     Visit # Insurance Allowable Auth required? Date Range    16 visits [x]  Yes - thru [employer]  []  No -23         Latex Allergy:  [x]NO      []YES  Preferred Language for Healthcare:   [x]English       []other:    Functional Scale:       Date assessed:  DARIN: raw score = 20/45; dysfunction = 44% 23    Pain level:  8/10     SUBJECTIVE:    Pt reports she was hurting as bad after last session as she had been at previous sessions, but still her pain remains the same.     OBJECTIVE: See eval  Observation:   Test measurements:      RESTRICTIONS/PRECAUTIONS: cardiac hx      Treatment based classification:    [] mobilization/manipulation   [] stabilization   [] extension based   [x] flexion based   [] lateral shift   [] traction   [] unspecified Components:   []